# Patient Record
Sex: FEMALE | Race: WHITE | Employment: FULL TIME | URBAN - METROPOLITAN AREA
[De-identification: names, ages, dates, MRNs, and addresses within clinical notes are randomized per-mention and may not be internally consistent; named-entity substitution may affect disease eponyms.]

---

## 2017-03-07 NOTE — TELEPHONE ENCOUNTER
From: Iona Cordero  To:  Zac Adams DO  Sent: 3/7/2017 4:13 PM CST  Subject: Medication Renewal Request    Original authorizing provider: DO Iona Sparks would like a refill of the following medications:  SUMAtriptan Succinate

## 2017-03-09 RX ORDER — SUMATRIPTAN 50 MG/1
TABLET, FILM COATED ORAL
Qty: 30 TABLET | Refills: 1 | OUTPATIENT
Start: 2017-03-09

## 2017-03-15 NOTE — TELEPHONE ENCOUNTER
From: Roney Nichole  To: Yamil Vásquez MD  Sent: 3/15/2017 2:38 PM CDT  Subject: Medication Renewal Request    Original authorizing provider: MD Roney Mejia would like a refill of the following medications:  Sertraline HCl

## 2017-03-16 RX ORDER — SERTRALINE HYDROCHLORIDE 100 MG/1
100 TABLET, FILM COATED ORAL DAILY
Qty: 90 TABLET | Refills: 0 | Status: SHIPPED
Start: 2017-03-16 | End: 2017-06-07

## 2017-06-07 ENCOUNTER — OFFICE VISIT (OUTPATIENT)
Dept: OBGYN CLINIC | Facility: CLINIC | Age: 47
End: 2017-06-07

## 2017-06-07 VITALS
SYSTOLIC BLOOD PRESSURE: 114 MMHG | HEIGHT: 62.5 IN | DIASTOLIC BLOOD PRESSURE: 62 MMHG | BODY MASS INDEX: 24.22 KG/M2 | WEIGHT: 135 LBS

## 2017-06-07 DIAGNOSIS — Z01.419 WELL WOMAN EXAM WITH ROUTINE GYNECOLOGICAL EXAM: Primary | ICD-10-CM

## 2017-06-07 DIAGNOSIS — F41.9 ANXIETY: ICD-10-CM

## 2017-06-07 DIAGNOSIS — Z12.31 SCREENING MAMMOGRAM, ENCOUNTER FOR: ICD-10-CM

## 2017-06-07 DIAGNOSIS — G43.709 CHRONIC MIGRAINE WITHOUT AURA WITHOUT STATUS MIGRAINOSUS, NOT INTRACTABLE: ICD-10-CM

## 2017-06-07 PROCEDURE — 99396 PREV VISIT EST AGE 40-64: CPT | Performed by: OBSTETRICS & GYNECOLOGY

## 2017-06-07 RX ORDER — SERTRALINE HYDROCHLORIDE 100 MG/1
100 TABLET, FILM COATED ORAL DAILY
Qty: 90 TABLET | Refills: 3 | Status: SHIPPED | OUTPATIENT
Start: 2017-06-07 | End: 2017-06-07

## 2017-06-07 RX ORDER — SUMATRIPTAN 50 MG/1
TABLET, FILM COATED ORAL
Qty: 9 TABLET | Refills: 5 | Status: SHIPPED | OUTPATIENT
Start: 2017-06-07 | End: 2018-08-07

## 2017-06-07 RX ORDER — SUMATRIPTAN 50 MG/1
TABLET, FILM COATED ORAL
Qty: 9 TABLET | Refills: 5 | Status: SHIPPED | OUTPATIENT
Start: 2017-06-07 | End: 2017-06-07

## 2017-06-07 RX ORDER — SERTRALINE HYDROCHLORIDE 100 MG/1
100 TABLET, FILM COATED ORAL DAILY
Qty: 90 TABLET | Refills: 3 | Status: SHIPPED | OUTPATIENT
Start: 2017-06-07 | End: 2018-08-07

## 2017-06-07 NOTE — PROGRESS NOTES
Patient ID:   Renita Figueroa  is a 55year old female [id number]        HPI:     Here for general gyn exam. Last seen February 2016. New medical/surgical hx:  None. Patient's last menstrual period was 05/20/2017. Menses:   Hx Prior Abnormal Pap: No rhythm, normal heart sounds. Pulmonary/Chest: Clear to auscultation. Breath sounds normal.  Breasts:   Symmetrical.  Firm tissue with homogeneous texture. No palpable abnormalities, tenderness, secretions, or adenopathy. Abdominal: Soft.  No distension

## 2017-06-07 NOTE — TELEPHONE ENCOUNTER
From: Perez Schroeder  To: Margarita Muir MD  Sent: 6/7/2017 10:25 AM CDT  Subject: Medication Renewal Request    Original authorizing provider: MD Perez Du would like a refill of the following medications:  Sertraline HCl

## 2018-07-17 ENCOUNTER — TELEPHONE (OUTPATIENT)
Dept: OBGYN CLINIC | Facility: CLINIC | Age: 48
End: 2018-07-17

## 2018-07-17 DIAGNOSIS — Z12.31 ENCOUNTER FOR SCREENING MAMMOGRAM FOR BREAST CANCER: Primary | ICD-10-CM

## 2018-07-17 NOTE — TELEPHONE ENCOUNTER
3D Screening mammogram order entered. Pt had previous order for 3D mammogram by Dr. Ed Millan but order .

## 2018-08-07 ENCOUNTER — OFFICE VISIT (OUTPATIENT)
Dept: OBGYN CLINIC | Facility: CLINIC | Age: 48
End: 2018-08-07
Payer: COMMERCIAL

## 2018-08-07 VITALS
DIASTOLIC BLOOD PRESSURE: 68 MMHG | WEIGHT: 132 LBS | BODY MASS INDEX: 24.6 KG/M2 | HEIGHT: 61.5 IN | SYSTOLIC BLOOD PRESSURE: 112 MMHG

## 2018-08-07 DIAGNOSIS — Z01.419 ENCOUNTER FOR GYNECOLOGICAL EXAMINATION WITHOUT ABNORMAL FINDING: Primary | ICD-10-CM

## 2018-08-07 DIAGNOSIS — N92.0 MENORRHAGIA WITH REGULAR CYCLE: ICD-10-CM

## 2018-08-07 DIAGNOSIS — Z00.00 ROUTINE HEALTH MAINTENANCE: ICD-10-CM

## 2018-08-07 PROCEDURE — 99396 PREV VISIT EST AGE 40-64: CPT | Performed by: OBSTETRICS & GYNECOLOGY

## 2018-08-07 RX ORDER — SERTRALINE HYDROCHLORIDE 100 MG/1
100 TABLET, FILM COATED ORAL DAILY
Qty: 90 TABLET | Refills: 3 | Status: SHIPPED | OUTPATIENT
Start: 2018-08-07 | End: 2018-10-29

## 2018-08-07 NOTE — PROGRESS NOTES
Patient ID:   Viry Jacinto  is a 50year old female [id number]        HPI:     Here for gyn exam. Last seen February 2016. New medical/surgical hx:  None. Doing well on generic Zoloft. Patient's last menstrual period was 07/15/2018 (approximate). No cervical adenopathy. Cardiovascular: Normal rate, rhythm, heart sounds. Pulmonary/Chest: Clear to auscultation. Breasts:   Symmetrical.  Soft tissue with homogeneous texture. No palpable abnormalities, secretions, or adenopathy. Abdominal: Soft.

## 2018-09-26 ENCOUNTER — HOSPITAL ENCOUNTER (OUTPATIENT)
Dept: CT IMAGING | Age: 48
Discharge: HOME | End: 2018-09-26
Attending: FAMILY MEDICINE

## 2018-09-26 DIAGNOSIS — Z13.9 ENCOUNTER FOR SCREENING: ICD-10-CM

## 2018-10-29 ENCOUNTER — OFFICE VISIT (OUTPATIENT)
Dept: FAMILY MEDICINE CLINIC | Facility: CLINIC | Age: 48
End: 2018-10-29
Payer: COMMERCIAL

## 2018-10-29 VITALS
BODY MASS INDEX: 23.92 KG/M2 | RESPIRATION RATE: 18 BRPM | HEIGHT: 61.7 IN | SYSTOLIC BLOOD PRESSURE: 110 MMHG | HEART RATE: 68 BPM | DIASTOLIC BLOOD PRESSURE: 70 MMHG | WEIGHT: 130 LBS | TEMPERATURE: 99 F | OXYGEN SATURATION: 97 %

## 2018-10-29 DIAGNOSIS — J02.9 SORE THROAT: Primary | ICD-10-CM

## 2018-10-29 PROCEDURE — 87147 CULTURE TYPE IMMUNOLOGIC: CPT | Performed by: FAMILY MEDICINE

## 2018-10-29 PROCEDURE — 87081 CULTURE SCREEN ONLY: CPT | Performed by: FAMILY MEDICINE

## 2018-10-29 PROCEDURE — 87880 STREP A ASSAY W/OPTIC: CPT | Performed by: FAMILY MEDICINE

## 2018-10-29 PROCEDURE — 99213 OFFICE O/P EST LOW 20 MIN: CPT | Performed by: FAMILY MEDICINE

## 2018-10-29 NOTE — PROGRESS NOTES
CC: sore throat    HPI:     Sore throat several days.  and daughter had similar. Some cough and body aches.      ROS: no fever, no vomiting or diarrhea    EXAM:   /70   Pulse 68   Temp 98.5 °F (36.9 °C) (Temporal)   Resp 18   Ht 61.7\"   Wt 130

## 2018-10-31 ENCOUNTER — HOSPITAL ENCOUNTER (OUTPATIENT)
Age: 48
Discharge: HOME | End: 2018-10-31
Attending: FAMILY MEDICINE
Payer: COMMERCIAL

## 2018-10-31 VITALS
HEART RATE: 113 BPM | BODY MASS INDEX: 24 KG/M2 | SYSTOLIC BLOOD PRESSURE: 120 MMHG | DIASTOLIC BLOOD PRESSURE: 83 MMHG | RESPIRATION RATE: 16 BRPM | TEMPERATURE: 99 F | WEIGHT: 130 LBS | OXYGEN SATURATION: 98 %

## 2018-10-31 DIAGNOSIS — J02.9 ACUTE VIRAL PHARYNGITIS: Primary | ICD-10-CM

## 2018-10-31 PROCEDURE — 87081 CULTURE SCREEN ONLY: CPT | Performed by: FAMILY MEDICINE

## 2018-10-31 PROCEDURE — 87430 STREP A AG IA: CPT | Performed by: FAMILY MEDICINE

## 2018-10-31 PROCEDURE — 99214 OFFICE O/P EST MOD 30 MIN: CPT

## 2018-10-31 PROCEDURE — 99204 OFFICE O/P NEW MOD 45 MIN: CPT

## 2018-10-31 RX ORDER — PREDNISONE 20 MG/1
20 TABLET ORAL DAILY
Qty: 3 TABLET | Refills: 0 | Status: SHIPPED | OUTPATIENT
Start: 2018-10-31 | End: 2018-11-03

## 2018-10-31 RX ORDER — BENZONATATE 100 MG/1
100 CAPSULE ORAL 3 TIMES DAILY PRN
Qty: 30 CAPSULE | Refills: 0 | Status: SHIPPED | OUTPATIENT
Start: 2018-10-31 | End: 2018-11-30

## 2018-10-31 RX ORDER — IBUPROFEN 600 MG/1
600 TABLET ORAL ONCE
Status: COMPLETED | OUTPATIENT
Start: 2018-10-31 | End: 2018-10-31

## 2018-10-31 NOTE — ED PROVIDER NOTES
Patient Seen in: [facility] In [place]    History   Patient presents with:  Sore Throat  Cough/URI    Stated Complaint: SORE THROAT    HPI    66-year-old female with a history of anxiety, chronic rhinitis, and migraines presents the IC secondar 98%   BMI 24.01 kg/m²         Physical Exam    Gen: Patient in no acute distress. HEENT: No sinus tenderness to palpation. Bilateral TMs are clear. Bilateral nares are boggy. MMM. Posterior pharynx is with erythema. No exudate.   Mildly enlarged uvula and

## 2018-10-31 NOTE — ED INITIAL ASSESSMENT (HPI)
Pt sts sore throat, cough/cold symptoms, body aches/chills/sweats began 3 days ago. Yesterday began with pain to chest when taking deep breaths. Mild SOB with activity. Cough is productive.

## 2018-11-01 RX ORDER — AMOXICILLIN 500 MG/1
500 CAPSULE ORAL 3 TIMES DAILY
Qty: 30 CAPSULE | Refills: 0 | Status: SHIPPED | OUTPATIENT
Start: 2018-11-01 | End: 2018-11-11

## 2018-11-08 ENCOUNTER — HOSPITAL ENCOUNTER (EMERGENCY)
Age: 48
Discharge: HOME | End: 2018-11-08
Attending: EMERGENCY MEDICINE
Payer: COMMERCIAL

## 2018-11-08 VITALS
HEIGHT: 62 IN | SYSTOLIC BLOOD PRESSURE: 114 MMHG | TEMPERATURE: 99 F | BODY MASS INDEX: 24.84 KG/M2 | RESPIRATION RATE: 14 BRPM | HEART RATE: 76 BPM | DIASTOLIC BLOOD PRESSURE: 73 MMHG | OXYGEN SATURATION: 100 % | WEIGHT: 135 LBS

## 2018-11-08 DIAGNOSIS — R53.83 FATIGUE, UNSPECIFIED TYPE: Primary | ICD-10-CM

## 2018-11-08 PROCEDURE — 99284 EMERGENCY DEPT VISIT MOD MDM: CPT | Performed by: EMERGENCY MEDICINE

## 2018-11-08 PROCEDURE — 96360 HYDRATION IV INFUSION INIT: CPT | Performed by: EMERGENCY MEDICINE

## 2018-11-08 PROCEDURE — 99283 EMERGENCY DEPT VISIT LOW MDM: CPT | Performed by: EMERGENCY MEDICINE

## 2018-11-08 PROCEDURE — 85025 COMPLETE CBC W/AUTO DIFF WBC: CPT | Performed by: EMERGENCY MEDICINE

## 2018-11-08 PROCEDURE — 80053 COMPREHEN METABOLIC PANEL: CPT | Performed by: EMERGENCY MEDICINE

## 2018-11-08 NOTE — ED INITIAL ASSESSMENT (HPI)
Sore throat 2 weeks,  Strep positive started on abx, was vomiting, throat better, able to swallow, has ringing in ears, tired, no energy, no fever

## 2018-11-08 NOTE — ED PROVIDER NOTES
Patient Seen in: [facility] [place]    History   Patient presents with:  Sore Throat  Fatigue (constitutional, neurologic)    Stated Complaint: sore throat, fatigue, ringing in ears    HPI    Carin Soares is a pleasant 27-year-old female p exam shows no uvula edema or shift. There is no tongue elevation and palatine tonsils show no purulent material or erythema.   No submandibular erythema and no tenderness along the sternocleidomastoid and no nuchal rigidity  Lungs are clear to auscultation

## 2019-04-30 NOTE — PROGRESS NOTES
CC: anxiety    HPI:     Location [facility] settings  Quality worried, up tight, hard to make decisions  Severity moderate  Duration chronic  Timing getting more frequent      ROS: some sleep disturbance, no si or hi, no palpitations    Past Medical History:

## 2019-07-13 ENCOUNTER — HOSPITAL ENCOUNTER (OUTPATIENT)
Dept: MAMMOGRAPHY | Age: 49
Discharge: HOME | End: 2019-07-13
Attending: OBSTETRICS & GYNECOLOGY
Payer: COMMERCIAL

## 2019-07-13 DIAGNOSIS — Z12.31 ENCOUNTER FOR SCREENING MAMMOGRAM FOR BREAST CANCER: ICD-10-CM

## 2019-07-13 PROCEDURE — 77063 BREAST TOMOSYNTHESIS BI: CPT | Performed by: OBSTETRICS & GYNECOLOGY

## 2019-07-13 PROCEDURE — 77067 SCR MAMMO BI INCL CAD: CPT | Performed by: OBSTETRICS & GYNECOLOGY

## 2019-07-23 ENCOUNTER — OFFICE VISIT (OUTPATIENT)
Dept: FAMILY MEDICINE CLINIC | Facility: CLINIC | Age: 49
End: 2019-07-23
Payer: COMMERCIAL

## 2019-07-23 VITALS
HEIGHT: 62 IN | HEART RATE: 72 BPM | BODY MASS INDEX: 25.03 KG/M2 | RESPIRATION RATE: 16 BRPM | DIASTOLIC BLOOD PRESSURE: 80 MMHG | TEMPERATURE: 98 F | WEIGHT: 136 LBS | SYSTOLIC BLOOD PRESSURE: 122 MMHG

## 2019-07-23 DIAGNOSIS — Z12.39 BREAST CANCER SCREENING: ICD-10-CM

## 2019-07-23 DIAGNOSIS — Z00.00 GENERAL MEDICAL EXAM: Primary | ICD-10-CM

## 2019-07-23 LAB
ALBUMIN SERPL-MCNC: 3.9 G/DL (ref 3.4–5)
ALBUMIN/GLOB SERPL: 1 {RATIO} (ref 1–2)
ALP LIVER SERPL-CCNC: 67 U/L (ref 39–100)
ALT SERPL-CCNC: 20 U/L (ref 13–56)
ANION GAP SERPL CALC-SCNC: 7 MMOL/L (ref 0–18)
AST SERPL-CCNC: 20 U/L (ref 15–37)
BILIRUB SERPL-MCNC: 0.6 MG/DL (ref 0.1–2)
BUN BLD-MCNC: 11 MG/DL (ref 7–18)
BUN/CREAT SERPL: 16.9 (ref 10–20)
CALCIUM BLD-MCNC: 9 MG/DL (ref 8.5–10.1)
CHLORIDE SERPL-SCNC: 108 MMOL/L (ref 98–112)
CHOLEST SMN-MCNC: 177 MG/DL (ref ?–200)
CO2 SERPL-SCNC: 22 MMOL/L (ref 21–32)
CREAT BLD-MCNC: 0.65 MG/DL (ref 0.55–1.02)
DEPRECATED RDW RBC AUTO: 48.8 FL (ref 35.1–46.3)
ERYTHROCYTE [DISTWIDTH] IN BLOOD BY AUTOMATED COUNT: 17.2 % (ref 11–15)
EST. AVERAGE GLUCOSE BLD GHB EST-MCNC: 128 MG/DL (ref 68–126)
GLOBULIN PLAS-MCNC: 3.9 G/DL (ref 2.8–4.4)
GLUCOSE BLD-MCNC: 87 MG/DL (ref 70–99)
HBA1C MFR BLD HPLC: 6.1 % (ref ?–5.7)
HCT VFR BLD AUTO: 34.9 % (ref 35–48)
HDLC SERPL-MCNC: 61 MG/DL (ref 40–59)
HGB BLD-MCNC: 10.7 G/DL (ref 12–16)
LDLC SERPL CALC-MCNC: 106 MG/DL (ref ?–100)
M PROTEIN MFR SERPL ELPH: 7.8 G/DL (ref 6.4–8.2)
MCH RBC QN AUTO: 23.8 PG (ref 26–34)
MCHC RBC AUTO-ENTMCNC: 30.7 G/DL (ref 31–37)
MCV RBC AUTO: 77.7 FL (ref 80–100)
NONHDLC SERPL-MCNC: 116 MG/DL (ref ?–130)
OSMOLALITY SERPL CALC.SUM OF ELEC: 283 MOSM/KG (ref 275–295)
PLATELET # BLD AUTO: 280 10(3)UL (ref 150–450)
POTASSIUM SERPL-SCNC: 4.1 MMOL/L (ref 3.5–5.1)
RBC # BLD AUTO: 4.49 X10(6)UL (ref 3.8–5.3)
SODIUM SERPL-SCNC: 137 MMOL/L (ref 136–145)
TRIGL SERPL-MCNC: 50 MG/DL (ref 30–149)
TSI SER-ACNC: 2.32 MIU/ML (ref 0.36–3.74)
VIT D+METAB SERPL-MCNC: 23.7 NG/ML (ref 30–100)
VLDLC SERPL CALC-MCNC: 10 MG/DL (ref 0–30)
WBC # BLD AUTO: 5.3 X10(3) UL (ref 4–11)

## 2019-07-23 PROCEDURE — 83036 HEMOGLOBIN GLYCOSYLATED A1C: CPT | Performed by: FAMILY MEDICINE

## 2019-07-23 PROCEDURE — 80050 GENERAL HEALTH PANEL: CPT | Performed by: FAMILY MEDICINE

## 2019-07-23 PROCEDURE — 82306 VITAMIN D 25 HYDROXY: CPT | Performed by: FAMILY MEDICINE

## 2019-07-23 PROCEDURE — 80061 LIPID PANEL: CPT | Performed by: FAMILY MEDICINE

## 2019-07-23 PROCEDURE — 99396 PREV VISIT EST AGE 40-64: CPT | Performed by: FAMILY MEDICINE

## 2019-07-23 NOTE — PROGRESS NOTES
Harmony Cheng is a 52year old female who presents for a complete physical exam.   HPI:   Pt generally doing well. Patient sees gynecologist for all breast and  care.           Immunization History  Administered            Date(s) Administered    Infl Past Medical History:   Diagnosis Date   • Anxiety    • Anxiety state, unspecified    • Chronic rhinitis    • Migraine    • Migraines       Past Surgical History:   Procedure Laterality Date   • D & C  2003    Missed AB      Family History   Problem Rela cervix pink and without lesion  RECTAL: deferred   MUSCULOSKELETAL: back is not tender,FROM of the back  EXTREMITIES: no cyanosis, clubbing or edema  NEURO: Oriented times three,cranial nerves are intact,motor and sensory are grossly intact    ASSESSMENT A

## 2019-08-06 ENCOUNTER — OFFICE VISIT (OUTPATIENT)
Dept: FAMILY MEDICINE CLINIC | Facility: CLINIC | Age: 49
End: 2019-08-06
Payer: COMMERCIAL

## 2019-08-06 VITALS
SYSTOLIC BLOOD PRESSURE: 108 MMHG | RESPIRATION RATE: 16 BRPM | OXYGEN SATURATION: 98 % | DIASTOLIC BLOOD PRESSURE: 68 MMHG | HEIGHT: 62 IN | HEART RATE: 94 BPM | BODY MASS INDEX: 24.84 KG/M2 | TEMPERATURE: 98 F | WEIGHT: 135 LBS

## 2019-08-06 DIAGNOSIS — D50.0 IRON DEFICIENCY ANEMIA DUE TO CHRONIC BLOOD LOSS: Primary | ICD-10-CM

## 2019-08-06 DIAGNOSIS — E55.9 HYPOVITAMINOSIS D: ICD-10-CM

## 2019-08-06 DIAGNOSIS — R73.09 ABNORMAL GLUCOSE: ICD-10-CM

## 2019-08-06 PROCEDURE — 99213 OFFICE O/P EST LOW 20 MIN: CPT | Performed by: FAMILY MEDICINE

## 2019-08-06 NOTE — PROGRESS NOTES
CC: Follow-up abnormal labs    HPI: Patient has anemia noted. Most likely iron deficiency discussed over-the-counter supplementation to improve this. Also has abnormal glucose.   Her fasting blood sugar was actually normal but her A1c is at 6.1 indicating supplement at night. Vitamin D supplement as well. Follow-up labs in 6 months. No orders of the defined types were placed in this encounter.       Meds & Refills for this Visit:  Requested Prescriptions      No prescriptions requested or ordered in this

## 2019-08-06 NOTE — PATIENT INSTRUCTIONS
1 \"Slow Fe\" tablet nightly     Vitamin D 2000 IU gelcap daily    Dietary recommendations: restrict intake of carbohydrates from sources like bread, pasta, rice, cereal, potatoes or other starchy vegetables.  Increase dramatically your intake of leafy gree

## 2019-10-28 ENCOUNTER — TELEPHONE (OUTPATIENT)
Dept: FAMILY MEDICINE CLINIC | Facility: CLINIC | Age: 49
End: 2019-10-28

## 2019-12-03 ENCOUNTER — TELEPHONE (OUTPATIENT)
Dept: FAMILY MEDICINE CLINIC | Facility: CLINIC | Age: 49
End: 2019-12-03

## 2019-12-03 ENCOUNTER — PATIENT MESSAGE (OUTPATIENT)
Dept: FAMILY MEDICINE CLINIC | Facility: CLINIC | Age: 49
End: 2019-12-03

## 2019-12-03 NOTE — TELEPHONE ENCOUNTER
From: Perez Schroeder  To: Chrissy Proctor DO  Sent: 12/3/2019 1:37 PM CST  Subject: Other    I had a mammogram done on 7-13-19. Please update my files. Thank you.   Abebe Palacios

## 2019-12-03 NOTE — TELEPHONE ENCOUNTER
[facility] sent back to patient asking where mammogram was done so we can obtain records.  There is an old order in [facility] from July 2019 placed by Dr Contreras Doshi

## 2020-01-30 ENCOUNTER — OFFICE VISIT (OUTPATIENT)
Dept: FAMILY MEDICINE CLINIC | Facility: CLINIC | Age: 50
End: 2020-01-30
Payer: COMMERCIAL

## 2020-01-30 VITALS
OXYGEN SATURATION: 98 % | BODY MASS INDEX: 27.23 KG/M2 | HEIGHT: 62 IN | DIASTOLIC BLOOD PRESSURE: 74 MMHG | TEMPERATURE: 97 F | SYSTOLIC BLOOD PRESSURE: 116 MMHG | HEART RATE: 112 BPM | RESPIRATION RATE: 16 BRPM | WEIGHT: 148 LBS

## 2020-01-30 DIAGNOSIS — J11.1 INFLUENZA: Primary | ICD-10-CM

## 2020-01-30 PROCEDURE — 99214 OFFICE O/P EST MOD 30 MIN: CPT | Performed by: FAMILY MEDICINE

## 2020-01-30 RX ORDER — OSELTAMIVIR PHOSPHATE 75 MG/1
75 CAPSULE ORAL 2 TIMES DAILY
Qty: 10 CAPSULE | Refills: 0 | Status: SHIPPED | OUTPATIENT
Start: 2020-01-30 | End: 2020-02-04

## 2020-01-30 NOTE — PROGRESS NOTES
CC: cough    HPI:     Location chest  Quality deep, dry  Severity moderate  Duration 1-2 days  Timing frequent  Context ill contact with flu positive coworker      ROS:   GENERAL HEALTH: feels generalized body aches  SKIN: denies any unusual skin lesions o 2 (two) times daily for 5 days.        Imaging & Consults:  None

## 2020-03-14 NOTE — TELEPHONE ENCOUNTER
LOV 1/30/20 for influenza. SERTRALINE HCL 50 MG Oral Tab 270 tablet 0 12/26/2019    Sig:   TAKE 3 TABLETS (=150MG     TOTAL) DAILY     Route:   (none)       No future appointments.

## 2020-06-02 NOTE — TELEPHONE ENCOUNTER
Last refilled on 3/15/20 for # 270 with 0 refills  Last OV 1/30/20 for flu  Future Appointments   Date Time Provider Walt [place]   6/18/2020  8:00 AM DO MIKY AlasOSW [facility] [place]        Thank you.

## 2020-06-18 ENCOUNTER — OFFICE VISIT (OUTPATIENT)
Dept: FAMILY MEDICINE CLINIC | Facility: CLINIC | Age: 50
End: 2020-06-18
Payer: COMMERCIAL

## 2020-06-18 VITALS
HEIGHT: 61.8 IN | SYSTOLIC BLOOD PRESSURE: 110 MMHG | WEIGHT: 150.19 LBS | OXYGEN SATURATION: 99 % | HEART RATE: 96 BPM | BODY MASS INDEX: 27.64 KG/M2 | DIASTOLIC BLOOD PRESSURE: 78 MMHG | RESPIRATION RATE: 16 BRPM | TEMPERATURE: 99 F

## 2020-06-18 DIAGNOSIS — Z00.00 GENERAL MEDICAL EXAM: Primary | ICD-10-CM

## 2020-06-18 DIAGNOSIS — Z12.11 COLON CANCER SCREENING: ICD-10-CM

## 2020-06-18 DIAGNOSIS — Z12.39 BREAST CANCER SCREENING: ICD-10-CM

## 2020-06-18 PROCEDURE — 99396 PREV VISIT EST AGE 40-64: CPT | Performed by: FAMILY MEDICINE

## 2020-06-18 PROCEDURE — 82306 VITAMIN D 25 HYDROXY: CPT | Performed by: FAMILY MEDICINE

## 2020-06-18 PROCEDURE — 80050 GENERAL HEALTH PANEL: CPT | Performed by: FAMILY MEDICINE

## 2020-06-18 PROCEDURE — 80061 LIPID PANEL: CPT | Performed by: FAMILY MEDICINE

## 2020-06-18 NOTE — PROGRESS NOTES
Erick Maddox is a 52year old female who presents for a complete physical exam.   HPI:   Pt generally doing well. Patient sees gynecologist for all breast and  care. NO complaints.        Immunization History  Administered            Date(s) [employer] tablet 0   • VALACYCLOVIR HCL OR Take 1 tablet by mouth as needed.      • SUMAtriptan Succinate (IMITREX) 50 MG Oral Tab 1 tab po prn migraine, may repeat q 2 hours with max dose of 200mg in 24 hours 30 tablet 1      Past Medical History:   Diagnosis Date lesions on exposed areas  HEENT: atraumatic, normocephalic,ears and throat are clear  EYES:PERRLA, EOMI, conjunctiva are clear  NECK: supple,no adenopathy,no bruits  BREAST: Normal contours bilaterally without palpable mass  LUNGS: clear to auscultation  C

## 2020-06-19 ENCOUNTER — TELEPHONE (OUTPATIENT)
Dept: FAMILY MEDICINE CLINIC | Facility: CLINIC | Age: 50
End: 2020-06-19

## 2020-06-19 NOTE — TELEPHONE ENCOUNTER
----- Message from Mary Ann Menezes DO sent at 6/19/2020 12:01 PM CDT -----  Schedule telephone visit for next week to go over labs - main concern is the anemia, no emergencies.

## 2020-06-19 NOTE — TELEPHONE ENCOUNTER
Spoke with pt  Pt notify  She verbalized understanding. Apt scheduled.   Future Appointments   Date Time Provider Walt Valdez   6/23/2020  4:15 PM DO AYDE Nguyen [facility] [place]

## 2020-06-23 ENCOUNTER — OFFICE VISIT (OUTPATIENT)
Dept: FAMILY MEDICINE CLINIC | Facility: CLINIC | Age: 50
End: 2020-06-23
Payer: COMMERCIAL

## 2020-06-23 VITALS
RESPIRATION RATE: 18 BRPM | HEIGHT: 61.8 IN | HEART RATE: 90 BPM | OXYGEN SATURATION: 98 % | SYSTOLIC BLOOD PRESSURE: 130 MMHG | DIASTOLIC BLOOD PRESSURE: 84 MMHG | TEMPERATURE: 98 F | BODY MASS INDEX: 27.6 KG/M2 | WEIGHT: 150 LBS

## 2020-06-23 DIAGNOSIS — D64.9 ANEMIA, UNSPECIFIED TYPE: Primary | ICD-10-CM

## 2020-06-23 PROCEDURE — 99213 OFFICE O/P EST LOW 20 MIN: CPT | Performed by: FAMILY MEDICINE

## 2020-06-30 NOTE — PROGRESS NOTES
CC: Follow-up labs    HPI:     Patient continues to be significantly anemic. She does have heavy menstrual cycles. Discussed consultation with gynecologist for possible ablation procedure.   Also vitamin D is low and she was advised to take a supplement

## 2020-07-20 ENCOUNTER — TELEPHONE (OUTPATIENT)
Dept: FAMILY MEDICINE CLINIC | Facility: CLINIC | Age: 50
End: 2020-07-20

## 2020-08-15 ENCOUNTER — HOSPITAL ENCOUNTER (OUTPATIENT)
Dept: MAMMOGRAPHY | Age: 50
Discharge: HOME | End: 2020-08-15
Attending: FAMILY MEDICINE
Payer: COMMERCIAL

## 2020-08-15 DIAGNOSIS — Z12.39 BREAST CANCER SCREENING: ICD-10-CM

## 2020-08-15 PROCEDURE — 77067 SCR MAMMO BI INCL CAD: CPT | Performed by: FAMILY MEDICINE

## 2020-08-15 PROCEDURE — 77063 BREAST TOMOSYNTHESIS BI: CPT | Performed by: FAMILY MEDICINE

## 2020-08-24 ENCOUNTER — HOSPITAL ENCOUNTER (OUTPATIENT)
Dept: MAMMOGRAPHY | Age: 50
Discharge: HOME | End: 2020-08-24
Attending: FAMILY MEDICINE
Payer: COMMERCIAL

## 2020-08-24 ENCOUNTER — HOSPITAL ENCOUNTER (OUTPATIENT)
Dept: ULTRASOUND IMAGING | Age: 50
Discharge: HOME | End: 2020-08-24
Attending: FAMILY MEDICINE
Payer: COMMERCIAL

## 2020-08-24 DIAGNOSIS — R92.2 INCONCLUSIVE MAMMOGRAM: ICD-10-CM

## 2020-08-24 PROCEDURE — 76642 ULTRASOUND BREAST LIMITED: CPT | Performed by: FAMILY MEDICINE

## 2020-08-24 PROCEDURE — 77065 DX MAMMO INCL CAD UNI: CPT | Performed by: FAMILY MEDICINE

## 2020-08-24 PROCEDURE — 77061 BREAST TOMOSYNTHESIS UNI: CPT | Performed by: FAMILY MEDICINE

## 2020-09-08 ENCOUNTER — OFFICE VISIT (OUTPATIENT)
Dept: OBGYN CLINIC | Facility: CLINIC | Age: 50
End: 2020-09-08
Payer: COMMERCIAL

## 2020-09-08 VITALS
TEMPERATURE: 98 F | DIASTOLIC BLOOD PRESSURE: 78 MMHG | HEIGHT: 62.5 IN | SYSTOLIC BLOOD PRESSURE: 122 MMHG | BODY MASS INDEX: 27.27 KG/M2 | WEIGHT: 152 LBS

## 2020-09-08 DIAGNOSIS — Z01.419 WELL WOMAN EXAM WITH ROUTINE GYNECOLOGICAL EXAM: Primary | ICD-10-CM

## 2020-09-08 DIAGNOSIS — Z12.4 CERVICAL CANCER SCREENING: ICD-10-CM

## 2020-09-08 DIAGNOSIS — N93.9 ABNORMAL UTERINE BLEEDING (AUB): ICD-10-CM

## 2020-09-08 PROCEDURE — 87624 HPV HI-RISK TYP POOLED RSLT: CPT | Performed by: OBSTETRICS & GYNECOLOGY

## 2020-09-08 PROCEDURE — 58100 BIOPSY OF UTERUS LINING: CPT | Performed by: OBSTETRICS & GYNECOLOGY

## 2020-09-08 PROCEDURE — 3008F BODY MASS INDEX DOCD: CPT | Performed by: OBSTETRICS & GYNECOLOGY

## 2020-09-08 PROCEDURE — 3074F SYST BP LT 130 MM HG: CPT | Performed by: OBSTETRICS & GYNECOLOGY

## 2020-09-08 PROCEDURE — 88305 TISSUE EXAM BY PATHOLOGIST: CPT | Performed by: OBSTETRICS & GYNECOLOGY

## 2020-09-08 PROCEDURE — 3078F DIAST BP <80 MM HG: CPT | Performed by: OBSTETRICS & GYNECOLOGY

## 2020-09-08 PROCEDURE — 99396 PREV VISIT EST AGE 40-64: CPT | Performed by: OBSTETRICS & GYNECOLOGY

## 2020-09-08 NOTE — PROCEDURES
Procedure: Endometrial biopsy     Date of Procedure: 09/08/20    Pre-procedure diagnosis:  AUB    Post-procedure diagnosis:   AUB    Indications:   48year old female [id number] who presents for endometrial biopsy 2/2 AUB    Procedure details:   The procedure,

## 2020-09-08 NOTE — PROGRESS NOTES
OB/GYN H&P       CC: Patient presents with:  Physical: NP, PT states she would like to discuss low iron level due to heavy cycles  Other: OK with Student       HPI: Sheree Ferreira is a 48year old  here for WWE.   Referred by Dr. Patt Redman and martins Problem Relation Age of Onset   • Diabetes Father    • Heart Disorder Father    • Stroke Neg    • Heart Disease Neg    • Cancer Neg      Social History    Tobacco Use      Smoking status: Never Smoker      Smokeless tobacco: Never Used      Tobacco comme REQUEST B    Abnormal uterine bleeding (AUB)        Relevant Orders    SURGICAL PATHOLOGY TISSUE          Patient counseled on diet/exercise     See procedure note for EMB. Discussed AUB with patient. Possible etiologies reviewed.    Reviewed RF for endo

## 2020-09-14 LAB — HPV I/H RISK 1 DNA SPEC QL NAA+PROBE: NEGATIVE

## 2020-09-14 NOTE — TELEPHONE ENCOUNTER
Medication Quantity Refills Start End   SERTRALINE HCL 50 MG Oral Tab 270 tablet 0 6/2/2020      Last OV 6/23/20 for anemia   No future appointments. Please advise.  Thank you

## 2020-12-18 ENCOUNTER — PATIENT MESSAGE (OUTPATIENT)
Dept: FAMILY MEDICINE CLINIC | Facility: CLINIC | Age: 50
End: 2020-12-18

## 2020-12-18 NOTE — TELEPHONE ENCOUNTER
Patient is due for follow up and repeat blood work. Saw Gyne to discuss ablation for heavy menstrual periods. What was decided?

## 2020-12-18 NOTE — TELEPHONE ENCOUNTER
LOV: 6/23/20 for anemia     SERTRALINE HCL 50 MG Oral Tab 270 tablet 0 9/21/2020    Sig:   TAKE 3 TABLETS (=150MG     TOTAL) DAILY       No future appointments. Please advise.

## 2020-12-18 NOTE — TELEPHONE ENCOUNTER
From: Min Paul  To: Chet Modi MD  Sent: 12/18/2020 8:09 AM CST  Subject: Prescription Question    I would like to renew my prescription for Sertraline. Can you please set that up?

## 2021-01-28 ENCOUNTER — HOSPITAL ENCOUNTER (EMERGENCY)
Facility: HOSPITAL | Age: 51
Discharge: HOME | End: 2021-01-28
Attending: EMERGENCY MEDICINE
Payer: COMMERCIAL

## 2021-01-28 ENCOUNTER — APPOINTMENT (OUTPATIENT)
Dept: CT IMAGING | Facility: HOSPITAL | Age: 51
End: 2021-01-28
Attending: EMERGENCY MEDICINE
Payer: COMMERCIAL

## 2021-01-28 ENCOUNTER — APPOINTMENT (OUTPATIENT)
Dept: GENERAL RADIOLOGY | Facility: HOSPITAL | Age: 51
End: 2021-01-28
Attending: EMERGENCY MEDICINE
Payer: COMMERCIAL

## 2021-01-28 ENCOUNTER — OFFICE VISIT (OUTPATIENT)
Dept: FAMILY MEDICINE CLINIC | Facility: CLINIC | Age: 51
End: 2021-01-28
Payer: COMMERCIAL

## 2021-01-28 VITALS
TEMPERATURE: 98 F | DIASTOLIC BLOOD PRESSURE: 88 MMHG | HEART RATE: 86 BPM | BODY MASS INDEX: 27.97 KG/M2 | HEIGHT: 62 IN | SYSTOLIC BLOOD PRESSURE: 142 MMHG | RESPIRATION RATE: 18 BRPM | OXYGEN SATURATION: 98 % | WEIGHT: 152 LBS

## 2021-01-28 VITALS
HEART RATE: 81 BPM | SYSTOLIC BLOOD PRESSURE: 127 MMHG | OXYGEN SATURATION: 98 % | WEIGHT: 151.88 LBS | RESPIRATION RATE: 16 BRPM | DIASTOLIC BLOOD PRESSURE: 84 MMHG | BODY MASS INDEX: 27.6 KG/M2 | HEIGHT: 62.01 IN | TEMPERATURE: 98 F

## 2021-01-28 DIAGNOSIS — R07.9 CHEST PAIN OF UNCERTAIN ETIOLOGY: Primary | ICD-10-CM

## 2021-01-28 DIAGNOSIS — R07.89 OTHER CHEST PAIN: Primary | ICD-10-CM

## 2021-01-28 LAB
ALBUMIN SERPL-MCNC: 3.8 G/DL (ref 3.4–5)
ALBUMIN/GLOB SERPL: 1.1 {RATIO} (ref 1–2)
ALP LIVER SERPL-CCNC: 78 U/L
ALT SERPL-CCNC: 23 U/L
ANION GAP SERPL CALC-SCNC: 4 MMOL/L (ref 0–18)
APTT PPP: 32 SECONDS (ref 25.4–36.1)
AST SERPL-CCNC: 15 U/L (ref 15–37)
BASOPHILS # BLD AUTO: 0.04 X10(3) UL (ref 0–0.2)
BASOPHILS NFR BLD AUTO: 0.5 %
BILIRUB SERPL-MCNC: 0.5 MG/DL (ref 0.1–2)
BUN BLD-MCNC: 10 MG/DL (ref 7–18)
BUN/CREAT SERPL: 12.5 (ref 10–20)
CALCIUM BLD-MCNC: 8.5 MG/DL (ref 8.5–10.1)
CHLORIDE SERPL-SCNC: 108 MMOL/L (ref 98–112)
CO2 SERPL-SCNC: 25 MMOL/L (ref 21–32)
CREAT BLD-MCNC: 0.8 MG/DL
D-DIMER: 0.63 UG/ML FEU (ref ?–0.5)
DEPRECATED RDW RBC AUTO: 42.6 FL (ref 35.1–46.3)
EOSINOPHIL # BLD AUTO: 0.33 X10(3) UL (ref 0–0.7)
EOSINOPHIL NFR BLD AUTO: 4.2 %
ERYTHROCYTE [DISTWIDTH] IN BLOOD BY AUTOMATED COUNT: 13.1 % (ref 11–15)
GLOBULIN PLAS-MCNC: 3.5 G/DL (ref 2.8–4.4)
GLUCOSE BLD-MCNC: 146 MG/DL (ref 70–99)
HCT VFR BLD AUTO: 37.6 %
HGB BLD-MCNC: 12.6 G/DL
IMM GRANULOCYTES # BLD AUTO: 0.04 X10(3) UL (ref 0–1)
IMM GRANULOCYTES NFR BLD: 0.5 %
INR BLD: 0.99 (ref 0.89–1.11)
LIPASE SERPL-CCNC: 244 U/L (ref 73–393)
LYMPHOCYTES # BLD AUTO: 1.58 X10(3) UL (ref 1–4)
LYMPHOCYTES NFR BLD AUTO: 20 %
M PROTEIN MFR SERPL ELPH: 7.3 G/DL (ref 6.4–8.2)
MCH RBC QN AUTO: 30 PG (ref 26–34)
MCHC RBC AUTO-ENTMCNC: 33.5 G/DL (ref 31–37)
MCV RBC AUTO: 89.5 FL
MONOCYTES # BLD AUTO: 0.62 X10(3) UL (ref 0.1–1)
MONOCYTES NFR BLD AUTO: 7.8 %
NEUTROPHILS # BLD AUTO: 5.3 X10 (3) UL (ref 1.5–7.7)
NEUTROPHILS # BLD AUTO: 5.3 X10(3) UL (ref 1.5–7.7)
NEUTROPHILS NFR BLD AUTO: 67 %
NT-PROBNP SERPL-MCNC: 19 PG/ML (ref ?–125)
OSMOLALITY SERPL CALC.SUM OF ELEC: 286 MOSM/KG (ref 275–295)
PLATELET # BLD AUTO: 224 10(3)UL (ref 150–450)
POTASSIUM SERPL-SCNC: 3.6 MMOL/L (ref 3.5–5.1)
PSA SERPL DL<=0.01 NG/ML-MCNC: 13.4 SECONDS (ref 12.4–14.6)
RBC # BLD AUTO: 4.2 X10(6)UL
SARS-COV-2 RNA RESP QL NAA+PROBE: NOT DETECTED
SODIUM SERPL-SCNC: 137 MMOL/L (ref 136–145)
TROPONIN I SERPL-MCNC: <0.045 NG/ML (ref ?–0.04)
WBC # BLD AUTO: 7.9 X10(3) UL (ref 4–11)

## 2021-01-28 PROCEDURE — 36415 COLL VENOUS BLD VENIPUNCTURE: CPT

## 2021-01-28 PROCEDURE — 85610 PROTHROMBIN TIME: CPT | Performed by: EMERGENCY MEDICINE

## 2021-01-28 PROCEDURE — 80053 COMPREHEN METABOLIC PANEL: CPT

## 2021-01-28 PROCEDURE — 83690 ASSAY OF LIPASE: CPT | Performed by: EMERGENCY MEDICINE

## 2021-01-28 PROCEDURE — 71045 X-RAY EXAM CHEST 1 VIEW: CPT | Performed by: EMERGENCY MEDICINE

## 2021-01-28 PROCEDURE — 85025 COMPLETE CBC W/AUTO DIFF WBC: CPT | Performed by: EMERGENCY MEDICINE

## 2021-01-28 PROCEDURE — 85379 FIBRIN DEGRADATION QUANT: CPT | Performed by: EMERGENCY MEDICINE

## 2021-01-28 PROCEDURE — 99213 OFFICE O/P EST LOW 20 MIN: CPT | Performed by: FAMILY MEDICINE

## 2021-01-28 PROCEDURE — 85025 COMPLETE CBC W/AUTO DIFF WBC: CPT

## 2021-01-28 PROCEDURE — 84484 ASSAY OF TROPONIN QUANT: CPT | Performed by: EMERGENCY MEDICINE

## 2021-01-28 PROCEDURE — 99285 EMERGENCY DEPT VISIT HI MDM: CPT

## 2021-01-28 PROCEDURE — 83880 ASSAY OF NATRIURETIC PEPTIDE: CPT | Performed by: EMERGENCY MEDICINE

## 2021-01-28 PROCEDURE — 3008F BODY MASS INDEX DOCD: CPT | Performed by: FAMILY MEDICINE

## 2021-01-28 PROCEDURE — 85730 THROMBOPLASTIN TIME PARTIAL: CPT | Performed by: EMERGENCY MEDICINE

## 2021-01-28 PROCEDURE — 93005 ELECTROCARDIOGRAM TRACING: CPT

## 2021-01-28 PROCEDURE — 3077F SYST BP >= 140 MM HG: CPT | Performed by: FAMILY MEDICINE

## 2021-01-28 PROCEDURE — 80053 COMPREHEN METABOLIC PANEL: CPT | Performed by: EMERGENCY MEDICINE

## 2021-01-28 PROCEDURE — 3079F DIAST BP 80-89 MM HG: CPT | Performed by: FAMILY MEDICINE

## 2021-01-28 PROCEDURE — 93010 ELECTROCARDIOGRAM REPORT: CPT

## 2021-01-28 PROCEDURE — 71260 CT THORAX DX C+: CPT | Performed by: EMERGENCY MEDICINE

## 2021-01-28 RX ORDER — ASCORBIC ACID 500 MG
TABLET ORAL
COMMUNITY

## 2021-01-28 RX ORDER — ASPIRIN 81 MG/1
324 TABLET, CHEWABLE ORAL ONCE
Status: COMPLETED | OUTPATIENT
Start: 2021-01-28 | End: 2021-01-28

## 2021-01-28 NOTE — PROGRESS NOTES
No chief complaint on file. Chest pain  HPI:    Patient ID: Jourdan Hardin is a 48year old female.     HPI   Patient reports 3 episodes since Monday when she had pressure like chest pain substernal. On Monday episode was at night while she was sleep Sitting, Cuff Size: adult)   Pulse 86   Temp 97.7 °F (36.5 °C) (Temporal)   Resp 18   Ht 5' 2\" (1.575 m)   Wt 152 lb (68.9 kg)   LMP 01/20/2021 (Exact Date)   SpO2 98%   Breastfeeding No   BMI 27.80 kg/m²    Physical Exam    Constitutional: No distress.

## 2021-01-29 ENCOUNTER — HOSPITAL ENCOUNTER (OUTPATIENT)
Dept: CV DIAGNOSTICS | Facility: HOSPITAL | Age: 51
Discharge: HOME | End: 2021-01-29
Attending: EMERGENCY MEDICINE
Payer: COMMERCIAL

## 2021-01-29 DIAGNOSIS — R07.9 CHEST PAIN OF UNCERTAIN ETIOLOGY: ICD-10-CM

## 2021-01-29 LAB
ATRIAL RATE: 83 BPM
P AXIS: 65 DEGREES
P-R INTERVAL: 168 MS
Q-T INTERVAL: 366 MS
QRS DURATION: 88 MS
QTC CALCULATION (BEZET): 430 MS
R AXIS: 43 DEGREES
T AXIS: 74 DEGREES
VENTRICULAR RATE: 83 BPM

## 2021-01-29 PROCEDURE — 93018 CV STRESS TEST I&R ONLY: CPT | Performed by: EMERGENCY MEDICINE

## 2021-01-29 PROCEDURE — 93350 STRESS TTE ONLY: CPT | Performed by: EMERGENCY MEDICINE

## 2021-01-29 PROCEDURE — 93017 CV STRESS TEST TRACING ONLY: CPT | Performed by: EMERGENCY MEDICINE

## 2021-01-29 NOTE — ED PROVIDER NOTES
Patient Seen in: [facility] Emergency Department      History   Patient presents with:  Chest Pain Angina    Stated Complaint: chest pain    HPI/Subjective:   HPI    51-year-old female presents emergency room for evaluation of chest pain.   Patient d Comment: OCC    Drug use: No             Review of Systems    Positive for stated complaint: chest pain  Other systems are as noted in HPI. Constitutional and vital signs reviewed. All other systems reviewed and negative except as noted above.     Ramu ---------                               -----------         ------                     CBC W/ DIFFERENTIAL[[id number]]                              Final result                 Please view results for these tests on the individual orders. reasonable clinical confidence and prior to discharge, that an emergency medical condition was not or was no longer present. There was no indication for further evaluation, treatment, or admission on an emergency basis.   Comprehensive verbal and written d

## 2021-01-29 NOTE — ED INITIAL ASSESSMENT (HPI)
Intermittent Chest pain x5 days. Pt states she woke from her sleep on saturday with chest discomfort. Heaviness in chest that traveled down both arms. Pt felt very tied after incident. Similar episode happened again Sunday night.   Pt feels ok today, saw

## 2021-01-29 NOTE — CM/SW NOTE
Scheduled outpatient stress test at [address] at [phone]. Called patient and she is aware and instructions given for no caffeine, light breakfast, and loose comfortable clothing with gym shoes.

## 2021-02-05 NOTE — TELEPHONE ENCOUNTER
Schedule her for office visit for er follow up and then we can discuss labs and order if needed. Tell her to come fasting. I did sent her meds.

## 2021-02-20 ENCOUNTER — OFFICE VISIT (OUTPATIENT)
Dept: FAMILY MEDICINE CLINIC | Facility: CLINIC | Age: 51
End: 2021-02-20
Payer: COMMERCIAL

## 2021-02-20 VITALS
SYSTOLIC BLOOD PRESSURE: 122 MMHG | HEART RATE: 91 BPM | TEMPERATURE: 97 F | DIASTOLIC BLOOD PRESSURE: 84 MMHG | BODY MASS INDEX: 27.05 KG/M2 | HEIGHT: 62 IN | RESPIRATION RATE: 18 BRPM | WEIGHT: 147 LBS | OXYGEN SATURATION: 98 %

## 2021-02-20 DIAGNOSIS — F41.9 ANXIETY: ICD-10-CM

## 2021-02-20 DIAGNOSIS — R79.89 LOW T4: ICD-10-CM

## 2021-02-20 DIAGNOSIS — R07.89 OTHER CHEST PAIN: Primary | ICD-10-CM

## 2021-02-20 DIAGNOSIS — E78.2 MIXED HYPERLIPIDEMIA: ICD-10-CM

## 2021-02-20 DIAGNOSIS — R73.09 ABNORMAL GLUCOSE: ICD-10-CM

## 2021-02-20 DIAGNOSIS — Z09 FOLLOW-UP EXAM: ICD-10-CM

## 2021-02-20 LAB
CHOLEST SMN-MCNC: 203 MG/DL (ref ?–200)
EST. AVERAGE GLUCOSE BLD GHB EST-MCNC: 111 MG/DL (ref 68–126)
HBA1C MFR BLD HPLC: 5.5 % (ref ?–5.7)
HDLC SERPL-MCNC: 46 MG/DL (ref 40–59)
LDLC SERPL CALC-MCNC: 139 MG/DL (ref ?–100)
NONHDLC SERPL-MCNC: 157 MG/DL (ref ?–130)
PATIENT FASTING Y/N/NP: YES
T4 FREE SERPL-MCNC: 0.7 NG/DL (ref 0.8–1.7)
TRIGL SERPL-MCNC: 92 MG/DL (ref 30–149)
TSI SER-ACNC: 2.02 MIU/ML (ref 0.36–3.74)
VLDLC SERPL CALC-MCNC: 18 MG/DL (ref 0–30)

## 2021-02-20 PROCEDURE — 3008F BODY MASS INDEX DOCD: CPT | Performed by: FAMILY MEDICINE

## 2021-02-20 PROCEDURE — 84481 FREE ASSAY (FT-3): CPT | Performed by: FAMILY MEDICINE

## 2021-02-20 PROCEDURE — 83036 HEMOGLOBIN GLYCOSYLATED A1C: CPT | Performed by: FAMILY MEDICINE

## 2021-02-20 PROCEDURE — 84443 ASSAY THYROID STIM HORMONE: CPT | Performed by: FAMILY MEDICINE

## 2021-02-20 PROCEDURE — 3079F DIAST BP 80-89 MM HG: CPT | Performed by: FAMILY MEDICINE

## 2021-02-20 PROCEDURE — 80061 LIPID PANEL: CPT | Performed by: FAMILY MEDICINE

## 2021-02-20 PROCEDURE — 3074F SYST BP LT 130 MM HG: CPT | Performed by: FAMILY MEDICINE

## 2021-02-20 PROCEDURE — 99214 OFFICE O/P EST MOD 30 MIN: CPT | Performed by: FAMILY MEDICINE

## 2021-02-20 PROCEDURE — 84439 ASSAY OF FREE THYROXINE: CPT | Performed by: FAMILY MEDICINE

## 2021-02-20 NOTE — PROGRESS NOTES
Patient presents with: Follow - Up: ER- chest pain and labs       HPI:    Patient ID: Harmony Cheng is a 48year old female. HPI   ER follow up for chest pain- stress echo done was neg for cardiac disease.  She did have 2 episode of chest pain since Ht 5' 2\" (1.575 m)   Wt 147 lb (66.7 kg)   LMP 01/20/2021 (Exact Date)   SpO2 98%   BMI 26.89 kg/m²    Physical Exam    Constitutional: No distress. Cardiovascular: Normal rate and regular rhythm.     Pulmonary/Chest: Effort normal and breath sounds norm

## 2021-02-21 ENCOUNTER — TELEPHONE (OUTPATIENT)
Dept: FAMILY MEDICINE CLINIC | Facility: CLINIC | Age: 51
End: 2021-02-21

## 2021-02-21 DIAGNOSIS — R79.89 LOW T4: Primary | ICD-10-CM

## 2021-02-21 LAB — T3FREE SERPL-MCNC: 1.97 PG/ML (ref 2.4–4.2)

## 2021-02-22 ENCOUNTER — TELEPHONE (OUTPATIENT)
Dept: FAMILY MEDICINE CLINIC | Facility: CLINIC | Age: 51
End: 2021-02-22

## 2021-02-22 NOTE — TELEPHONE ENCOUNTER
----- Message from Marely Marquez MD sent at 2/21/2021  9:41 PM CST -----  Call patient and schedule video visit to discuss labs.

## 2021-02-22 NOTE — TELEPHONE ENCOUNTER
Delivery Read From Message Type Attachments Subject   2/22/2021  8:43 AM Vasquez Quintana RN Patient Medical Advice Request  test results

## 2021-03-08 NOTE — TELEPHONE ENCOUNTER
LOV 2/20/21    Sertraline HCl 50 MG Oral Tab 270 tablet 0 12/18/2020    Sig:   Take 3 tablets (150 mg total) by mouth daily. No future appointments.

## 2021-03-19 ENCOUNTER — TELEMEDICINE (OUTPATIENT)
Dept: FAMILY MEDICINE CLINIC | Facility: CLINIC | Age: 51
End: 2021-03-19

## 2021-03-19 DIAGNOSIS — E03.9 HYPOTHYROIDISM, UNSPECIFIED TYPE: Primary | ICD-10-CM

## 2021-03-19 DIAGNOSIS — R07.89 OTHER CHEST PAIN: ICD-10-CM

## 2021-03-19 DIAGNOSIS — F41.9 ANXIETY: ICD-10-CM

## 2021-03-19 PROCEDURE — 99214 OFFICE O/P EST MOD 30 MIN: CPT | Performed by: FAMILY MEDICINE

## 2021-03-19 RX ORDER — LEVOTHYROXINE SODIUM 0.03 MG/1
25 TABLET ORAL
Qty: 90 TABLET | Refills: 0 | Status: SHIPPED | OUTPATIENT
Start: 2021-03-19 | End: 2021-04-28

## 2021-03-19 NOTE — PROGRESS NOTES
No chief complaint on file. Discussed labs and follow-up on chest pain. This visit is conducted using Telemedicine with live, interactive video and audio.     Patient has been referred to the [facility] website at [email or web address] to review the yearl Procedure Laterality Date   • D & C  2003    Missed AB      Family History   Problem Relation Age of Onset   • Diabetes Father    • Heart Disorder Father    • Stroke Neg    • Heart Disease Neg    • Cancer Neg       Social History: Social History    [place]

## 2021-03-19 NOTE — PATIENT INSTRUCTIONS
Hypothyroidism    You have hypothyroidism. This means your thyroid gland is not making enough thyroid hormone. This hormone is vital to body growth and metabolism. If you don’t make enough, many body processes slow down.  This can cause symptoms throughou your thyroid hormone pill without checking with your provider first.  · Tell your provider if you have any side effects from your medicines that bother you, especially any chest pain or irregular heartbeats.   · Never change the dosage or stop taking your t weight gain, hair loss, dry skin, and feeling cold. It also helps to treat goiter (an enlarged thyroid gland). It is also used to treat some kinds of thyroid cancer along with surgery and other medicines. How should I use this medicine?   Take this medicin · colestipol  · digoxin  · female hormones, like estrogens or progestins and birth control pills, patches, rings, or injections  · iron supplements  · kayexylate  · ketamine  · liquid nutrition products like Ensure  · lithium  · medicines for colds and gutierrez switch brands of this medicine unless your health care professional agrees with the change. Ask questions if you are uncertain. You will need regular exams and occasional blood tests to check the response to treatment.  If you are receiving this medicine f

## 2021-03-24 ENCOUNTER — TELEPHONE (OUTPATIENT)
Dept: FAMILY MEDICINE CLINIC | Facility: CLINIC | Age: 51
End: 2021-03-24

## 2021-03-24 NOTE — TELEPHONE ENCOUNTER
Please get update on her chest pain. She was started on levothyrodine. Any side effect. She will need to follow up in 6 wk for repeat thyroid labs and med check. Come fasting. Schedule her.

## 2021-03-25 NOTE — TELEPHONE ENCOUNTER
FYI    Patient states she only had one episode this week and only lasted about 20 mins. Has not experienced any more chest pain after   Patient not having any side effects from medication. 6 week f/u with fasting labs scheduled.   Future Appointments   Da

## 2021-04-02 ENCOUNTER — PATIENT MESSAGE (OUTPATIENT)
Dept: FAMILY MEDICINE CLINIC | Facility: CLINIC | Age: 51
End: 2021-04-02

## 2021-04-02 NOTE — TELEPHONE ENCOUNTER
From: Chloe President  To: Mallory Alvarez MD  Sent: 4/2/2021 10:46 AM CDT  Subject: Visit Follow-up Question    Dr. Severiano Duster,   I had another episode of pain in the middle of my chest, pain in my lower jaw and pain in my back and both arms.  It lasted abo

## 2021-04-18 DIAGNOSIS — E03.9 HYPOTHYROIDISM, UNSPECIFIED TYPE: ICD-10-CM

## 2021-04-19 RX ORDER — LEVOTHYROXINE SODIUM 0.03 MG/1
25 TABLET ORAL
Qty: 90 TABLET | Refills: 0 | OUTPATIENT
Start: 2021-04-19 | End: 2021-07-18

## 2021-04-19 NOTE — TELEPHONE ENCOUNTER
[facility] Provider Pharmacy         LEVOTHYROXIN TAB 25MCG 03/19/2021  25 30 Undefined  Sally [address] #. ..      Last refill 3/19/21 #90 0 refill  Should still have refill on file

## 2021-04-19 NOTE — TELEPHONE ENCOUNTER
Requesting refills be sent to [facility] instead of local pharmacy. Ok to send?   Last refill to local pharmacy 3/8/21 #270 0 refill  Last OV 3/19/21

## 2021-04-28 ENCOUNTER — PATIENT MESSAGE (OUTPATIENT)
Dept: FAMILY MEDICINE CLINIC | Facility: CLINIC | Age: 51
End: 2021-04-28

## 2021-04-28 DIAGNOSIS — E03.9 HYPOTHYROIDISM, UNSPECIFIED TYPE: ICD-10-CM

## 2021-04-28 RX ORDER — LEVOTHYROXINE SODIUM 0.03 MG/1
25 TABLET ORAL
Qty: 90 TABLET | Refills: 0 | Status: CANCELLED | OUTPATIENT
Start: 2021-04-28 | End: 2021-07-27

## 2021-04-28 RX ORDER — LEVOTHYROXINE SODIUM 0.03 MG/1
25 TABLET ORAL
Qty: 90 TABLET | Refills: 0 | Status: SHIPPED | OUTPATIENT
Start: 2021-04-28 | End: 2021-05-20

## 2021-04-28 NOTE — TELEPHONE ENCOUNTER
From: Gay Geiger  To: Lin Duffy MD  Sent: 4/28/2021 2:40 PM CDT  Subject: Prescription Question    Can you please update my prescription for Levothyroxine to a 90-day supply through the [facility] mail order?  My insurance will no longer cover 30 day

## 2021-04-28 NOTE — TELEPHONE ENCOUNTER
Last refilled on 3/19/21 for # 90 with 0 refills  Last labs TSH  2.020 on 2/20/21  Last OV televisit 3/19/21  Future Appointments   Date Time Provider Walt Valdez   5/8/2021  8:00 AM Mateus Lam MD [facility] [place]        Thank you.

## 2021-04-28 NOTE — TELEPHONE ENCOUNTER
Thyroid Supplements Protocol [id number]/28/2021 03:23 PM   TSH test in past 12 months Protocol Details    TSH value between 0.350 and 5.500 IU/ml     Appointment in past 12 or next 3 months

## 2021-04-29 ENCOUNTER — TELEPHONE (OUTPATIENT)
Dept: OBGYN CLINIC | Facility: CLINIC | Age: 51
End: 2021-04-29

## 2021-04-29 DIAGNOSIS — N93.9 ABNORMAL UTERINE BLEEDING: Primary | ICD-10-CM

## 2021-05-05 NOTE — TELEPHONE ENCOUNTER
----- Message from MD Jose sent at 4/22/2021  1:57 PM CDT -----  Surgeon: Dr. Garcia   Assistant: none     Type of Admit: Hospital outpatient, does not require admission following surgery   Procedure Location: Main OR    PreOp Dx: Abnormal uteri
Patient returning call to schedule surgery
Surgery scheduled for 6/19/2021 at 12:30p  Post op   Future Appointments   Date Time Provider Walt Valdez   5/8/2021  8:00 AM Frank Gaona MD [facility] [place]   7/1/2021  2:45 PM Michael Ruvalcaba MD [facility] [place]     Orders entered  Added
lvm for pt to schedule surgery
none

## 2021-05-15 PROCEDURE — 80061 LIPID PANEL: CPT | Performed by: NURSE PRACTITIONER

## 2021-05-15 PROCEDURE — 84439 ASSAY OF FREE THYROXINE: CPT | Performed by: NURSE PRACTITIONER

## 2021-05-15 PROCEDURE — 84443 ASSAY THYROID STIM HORMONE: CPT | Performed by: NURSE PRACTITIONER

## 2021-05-15 PROCEDURE — 84481 FREE ASSAY (FT-3): CPT | Performed by: NURSE PRACTITIONER

## 2021-05-15 PROCEDURE — 85025 COMPLETE CBC W/AUTO DIFF WBC: CPT | Performed by: NURSE PRACTITIONER

## 2021-05-15 NOTE — PROGRESS NOTES
HPI: HPI   Patient is here for follow up. Due for repeat blood work. At her last visit she discussed worsening anxiety with Dr. Alfred Kaufman. Has felt worse since starting low dose Levothyroxine.  Her anxiety is particularly increased the week prior to her per change, chills, fatigue and fever. Respiratory: Negative for cough. Cardiovascular: Negative for chest pain. Endocrine: Negative for cold intolerance and heat intolerance. Genitourinary: Positive for menstrual problem.    Psychiatric/Behavioral: Po

## 2021-05-17 ENCOUNTER — TELEPHONE (OUTPATIENT)
Dept: FAMILY MEDICINE CLINIC | Facility: CLINIC | Age: 51
End: 2021-05-17

## 2021-05-17 DIAGNOSIS — E78.2 MIXED HYPERLIPIDEMIA: Primary | ICD-10-CM

## 2021-05-17 NOTE — TELEPHONE ENCOUNTER
----- Message from MARITZA Yang sent at 5/17/2021  8:39 AM CDT -----  -Cholesterol improved. Continue lifestyle changes and recheck in 6 months  -Thyroid normalized, continue current dose of Levothyroxine.  Recheck 1 year  -Mildly anemic, has heav

## 2021-05-20 DIAGNOSIS — E03.9 HYPOTHYROIDISM, UNSPECIFIED TYPE: ICD-10-CM

## 2021-05-20 RX ORDER — LEVOTHYROXINE SODIUM 0.03 MG/1
25 TABLET ORAL
Qty: 90 TABLET | Refills: 0 | Status: SHIPPED | OUTPATIENT
Start: 2021-05-20 | End: 2021-07-15

## 2021-05-20 NOTE — TELEPHONE ENCOUNTER
See note below. Ins requires 90 days sent  Last refill 4/28/21 #90 0 refill  Last OV 5/15/21  Ok to send?

## 2021-05-20 NOTE — TELEPHONE ENCOUNTER
Levothyroxine Sodium 25 MCG Oral Tab    [facility] in [place] called, states they need a new rx for 90 day,  There is 30 day left, but insurance will only pay for a 90 day script.

## 2021-06-03 RX ORDER — ACETAMINOPHEN 500 MG
1000 TABLET ORAL ONCE
Status: CANCELLED | OUTPATIENT
Start: 2021-06-03 | End: 2021-06-03

## 2021-06-07 DIAGNOSIS — F41.9 ANXIETY: ICD-10-CM

## 2021-06-07 RX ORDER — BUSPIRONE HYDROCHLORIDE 5 MG/1
TABLET ORAL
Qty: 60 TABLET | Refills: 0 | Status: SHIPPED | OUTPATIENT
Start: 2021-06-07 | End: 2021-07-03

## 2021-06-08 ENCOUNTER — ANESTHESIA EVENT (OUTPATIENT)
Dept: SURGERY | Facility: HOSPITAL | Age: 51
End: 2021-06-08
Payer: COMMERCIAL

## 2021-06-16 ENCOUNTER — LAB ENCOUNTER (OUTPATIENT)
Dept: LAB | Facility: HOSPITAL | Age: 51
End: 2021-06-16
Attending: OBSTETRICS & GYNECOLOGY
Payer: COMMERCIAL

## 2021-06-16 DIAGNOSIS — N93.9 ABNORMAL UTERINE BLEEDING: ICD-10-CM

## 2021-06-19 ENCOUNTER — ANESTHESIA (OUTPATIENT)
Dept: SURGERY | Facility: HOSPITAL | Age: 51
End: 2021-06-19
Payer: COMMERCIAL

## 2021-06-19 ENCOUNTER — HOSPITAL ENCOUNTER (OUTPATIENT)
Facility: HOSPITAL | Age: 51
Setting detail: HOSPITAL OUTPATIENT SURGERY
Discharge: HOME | End: 2021-06-19
Attending: OBSTETRICS & GYNECOLOGY | Admitting: OBSTETRICS & GYNECOLOGY
Payer: COMMERCIAL

## 2021-06-19 VITALS
OXYGEN SATURATION: 100 % | RESPIRATION RATE: 16 BRPM | HEART RATE: 68 BPM | BODY MASS INDEX: 27.87 KG/M2 | WEIGHT: 147.63 LBS | TEMPERATURE: 98 F | SYSTOLIC BLOOD PRESSURE: 120 MMHG | HEIGHT: 61 IN | DIASTOLIC BLOOD PRESSURE: 75 MMHG

## 2021-06-19 DIAGNOSIS — N93.9 ABNORMAL UTERINE BLEEDING: Primary | ICD-10-CM

## 2021-06-19 PROCEDURE — 58563 HYSTEROSCOPY ABLATION: CPT | Performed by: OBSTETRICS & GYNECOLOGY

## 2021-06-19 PROCEDURE — 0U5B8ZZ DESTRUCTION OF ENDOMETRIUM, VIA NATURAL OR ARTIFICIAL OPENING ENDOSCOPIC: ICD-10-PCS | Performed by: OBSTETRICS & GYNECOLOGY

## 2021-06-19 RX ORDER — ONDANSETRON 2 MG/ML
4 INJECTION INTRAMUSCULAR; INTRAVENOUS AS NEEDED
Status: CANCELLED | OUTPATIENT
Start: 2021-06-19 | End: 2021-06-19

## 2021-06-19 RX ORDER — DEXAMETHASONE SODIUM PHOSPHATE 4 MG/ML
4 VIAL (ML) INJECTION AS NEEDED
Status: CANCELLED | OUTPATIENT
Start: 2021-06-19 | End: 2021-06-19

## 2021-06-19 RX ORDER — MIDAZOLAM HYDROCHLORIDE 1 MG/ML
INJECTION INTRAMUSCULAR; INTRAVENOUS AS NEEDED
Status: DISCONTINUED | OUTPATIENT
Start: 2021-06-19 | End: 2021-06-19 | Stop reason: SURG

## 2021-06-19 RX ORDER — DIPHENHYDRAMINE HYDROCHLORIDE 50 MG/ML
12.5 INJECTION INTRAMUSCULAR; INTRAVENOUS AS NEEDED
Status: CANCELLED | OUTPATIENT
Start: 2021-06-19 | End: 2021-06-19

## 2021-06-19 RX ORDER — IBUPROFEN 600 MG/1
600 TABLET ORAL EVERY 6 HOURS PRN
Qty: 20 TABLET | Refills: 0 | Status: SHIPPED | OUTPATIENT
Start: 2021-06-19 | End: 2021-08-16

## 2021-06-19 RX ORDER — MEPERIDINE HYDROCHLORIDE 25 MG/ML
12.5 INJECTION INTRAMUSCULAR; INTRAVENOUS; SUBCUTANEOUS AS NEEDED
Status: CANCELLED | OUTPATIENT
Start: 2021-06-19

## 2021-06-19 RX ORDER — HYDROCODONE BITARTRATE AND ACETAMINOPHEN 5; 325 MG/1; MG/1
2 TABLET ORAL AS NEEDED
Status: CANCELLED | OUTPATIENT
Start: 2021-06-19

## 2021-06-19 RX ORDER — SODIUM CHLORIDE, SODIUM LACTATE, POTASSIUM CHLORIDE, CALCIUM CHLORIDE 600; 310; 30; 20 MG/100ML; MG/100ML; MG/100ML; MG/100ML
INJECTION, SOLUTION INTRAVENOUS CONTINUOUS PRN
Status: DISCONTINUED | OUTPATIENT
Start: 2021-06-19 | End: 2021-06-19 | Stop reason: SURG

## 2021-06-19 RX ORDER — NALOXONE HYDROCHLORIDE 0.4 MG/ML
80 INJECTION, SOLUTION INTRAMUSCULAR; INTRAVENOUS; SUBCUTANEOUS AS NEEDED
Status: CANCELLED | OUTPATIENT
Start: 2021-06-19 | End: 2021-06-19

## 2021-06-19 RX ORDER — KETOROLAC TROMETHAMINE 30 MG/ML
INJECTION, SOLUTION INTRAMUSCULAR; INTRAVENOUS AS NEEDED
Status: DISCONTINUED | OUTPATIENT
Start: 2021-06-19 | End: 2021-06-19 | Stop reason: SURG

## 2021-06-19 RX ORDER — METOCLOPRAMIDE HYDROCHLORIDE 5 MG/ML
10 INJECTION INTRAMUSCULAR; INTRAVENOUS AS NEEDED
Status: CANCELLED | OUTPATIENT
Start: 2021-06-19 | End: 2021-06-19

## 2021-06-19 RX ORDER — HYDROMORPHONE HYDROCHLORIDE 1 MG/ML
0.4 INJECTION, SOLUTION INTRAMUSCULAR; INTRAVENOUS; SUBCUTANEOUS EVERY 5 MIN PRN
Status: CANCELLED | OUTPATIENT
Start: 2021-06-19 | End: 2021-06-19

## 2021-06-19 RX ORDER — MIDAZOLAM HYDROCHLORIDE 1 MG/ML
1 INJECTION INTRAMUSCULAR; INTRAVENOUS EVERY 5 MIN PRN
Status: CANCELLED | OUTPATIENT
Start: 2021-06-19 | End: 2021-06-19

## 2021-06-19 RX ORDER — SODIUM CHLORIDE, SODIUM LACTATE, POTASSIUM CHLORIDE, CALCIUM CHLORIDE 600; 310; 30; 20 MG/100ML; MG/100ML; MG/100ML; MG/100ML
INJECTION, SOLUTION INTRAVENOUS CONTINUOUS
Status: CANCELLED | OUTPATIENT
Start: 2021-06-19

## 2021-06-19 RX ORDER — SODIUM CHLORIDE, SODIUM LACTATE, POTASSIUM CHLORIDE, CALCIUM CHLORIDE 600; 310; 30; 20 MG/100ML; MG/100ML; MG/100ML; MG/100ML
INJECTION, SOLUTION INTRAVENOUS CONTINUOUS
Status: DISCONTINUED | OUTPATIENT
Start: 2021-06-19 | End: 2021-06-19

## 2021-06-19 RX ORDER — HYDROCODONE BITARTRATE AND ACETAMINOPHEN 5; 325 MG/1; MG/1
1 TABLET ORAL AS NEEDED
Status: CANCELLED | OUTPATIENT
Start: 2021-06-19

## 2021-06-19 RX ORDER — LIDOCAINE HYDROCHLORIDE 10 MG/ML
INJECTION, SOLUTION EPIDURAL; INFILTRATION; INTRACAUDAL; PERINEURAL AS NEEDED
Status: DISCONTINUED | OUTPATIENT
Start: 2021-06-19 | End: 2021-06-19 | Stop reason: SURG

## 2021-06-19 RX ADMIN — KETOROLAC TROMETHAMINE 30 MG: 30 INJECTION, SOLUTION INTRAMUSCULAR; INTRAVENOUS at 13:50:00

## 2021-06-19 RX ADMIN — LIDOCAINE HYDROCHLORIDE 50 MG: 10 INJECTION, SOLUTION EPIDURAL; INFILTRATION; INTRACAUDAL; PERINEURAL at 13:11:00

## 2021-06-19 RX ADMIN — SODIUM CHLORIDE, SODIUM LACTATE, POTASSIUM CHLORIDE, CALCIUM CHLORIDE: 600; 310; 30; 20 INJECTION, SOLUTION INTRAVENOUS at 13:09:00

## 2021-06-19 RX ADMIN — MIDAZOLAM HYDROCHLORIDE 2 MG: 1 INJECTION INTRAMUSCULAR; INTRAVENOUS at 13:09:00

## 2021-06-19 NOTE — ANESTHESIA PREPROCEDURE EVALUATION
PRE-OP EVALUATION    Patient Name: Yesy Oliva    Admit Diagnosis: Abnormal uterine bleeding [N93.9]    Pre-op Diagnosis: Abnormal uterine bleeding [N93.9]    dilation, hysteroscopy, hysteroscopic endometrial sampling and misa endometrial abla Neuro/Psych        (+) anxiety           (+) headaches                 Past Surgical History:   Procedure Laterality Date   • D & C  2003    Missed AB     Social History    Tobacco Use      Smoking status: Never Smoker      Smokeless tobacco: Never Used

## 2021-06-19 NOTE — BRIEF OP NOTE
Pre-Operative Diagnosis: Abnormal uterine bleeding [N93.9]     Post-Operative Diagnosis: Abnormal uterine bleeding [N93.9]      Procedure Performed:   dilation, hysteroscopy, hysteroscopic endometrial sampling and misa endometrial ablation with Naveed Mao

## 2021-06-19 NOTE — H&P
[facility]  Obstetrics and Gynecology  History & Physical    Yoselyn Villela Patient Status:  Hospital Outpatient Surgery    1970 MRN [id number]   Location [address] [place] [address] Attending Tomas Marmolejo Day 0 PCP Alcohol/week: 0.0 - 1.0 standard drinks      Comment: OCC      Home Meds: Levothyroxine Sodium 25 MCG Oral Tab, Take 1 tablet (25 mcg total) by mouth before breakfast., Disp: 90 tablet, Rfl: 0  Sertraline HCl 50 MG Oral Tab, Take 4 tablets (200 mg total) b all appropriate consents will be signed at the Hospital. Outpatient procedure is planned for today and anticipated. Follow up in office in 2 weeks following discharge to home.      MD Chuck   [address]

## 2021-06-19 NOTE — OPERATIVE REPORT
OPERATIVE REPORT:    PATIENT: Esteban Mcghee  MRN: [id number]  DATE OF PROCEDURE: 06/19/21    Pre-Operative Diagnosis: Abnormal uterine bleeding [N93.9]     Post-Operative Diagnosis: Abnormal uterine bleeding [N93.9]      Procedure Performed:   Rick Standard lateral surface of the endometrial cavity for a thorough sampling of the endometrium. This tissue was collected and sent to pathology as a separate sample. Attention was then turned towards the endometrial ablation portion of the procedure.  The uterus s condition        [facility]

## 2021-06-19 NOTE — ANESTHESIA POSTPROCEDURE EVALUATION
[address] [facility] Patient Status:  Hospital Outpatient Surgery   Age/Gender 48year old female MRN [id number]   [facility] SURGERY Attending Luis Guevara, [address] [facility] [address] Day # 0 PCP Mallory Alvarez MD       Anesthesia Post-op

## 2021-07-03 DIAGNOSIS — F41.9 ANXIETY: ICD-10-CM

## 2021-07-03 RX ORDER — BUSPIRONE HYDROCHLORIDE 5 MG/1
TABLET ORAL
Qty: 60 TABLET | Refills: 0 | Status: SHIPPED | OUTPATIENT
Start: 2021-07-03 | End: 2021-08-02

## 2021-07-03 NOTE — TELEPHONE ENCOUNTER
LOV: 5/15/21 for anxiety    BUSPIRONE HCL 5 MG Oral Tab 60 tablet 0 6/7/2021    Sig:   TAKE 1 TABLET BY MOUTH TWICE A DAY       Future Appointments   Date Time Provider Walt Valdez   7/9/2021  2:15 PM Ranulfo Richard MD [facility] [employer]

## 2021-07-09 ENCOUNTER — OFFICE VISIT (OUTPATIENT)
Dept: OBGYN CLINIC | Facility: CLINIC | Age: 51
End: 2021-07-09
Payer: COMMERCIAL

## 2021-07-09 VITALS — BODY MASS INDEX: 28 KG/M2 | WEIGHT: 150 LBS | SYSTOLIC BLOOD PRESSURE: 120 MMHG | DIASTOLIC BLOOD PRESSURE: 78 MMHG

## 2021-07-09 DIAGNOSIS — Z98.890 S/P ENDOMETRIAL ABLATION: Primary | ICD-10-CM

## 2021-07-09 PROCEDURE — 3074F SYST BP LT 130 MM HG: CPT | Performed by: OBSTETRICS & GYNECOLOGY

## 2021-07-09 PROCEDURE — 3078F DIAST BP <80 MM HG: CPT | Performed by: OBSTETRICS & GYNECOLOGY

## 2021-07-09 PROCEDURE — 99212 OFFICE O/P EST SF 10 MIN: CPT | Performed by: OBSTETRICS & GYNECOLOGY

## 2021-07-09 NOTE — PROGRESS NOTES
No chief complaint on file. S: Patient is here 2 weeks s/p [facility] endometrial ablation for AUB   She is doing well.    No bleeding  No pain      O: /78   Wt 150 lb (68 kg)   LMP 05/19/2021   BMI 28.34 kg/m²   Gen: NAD  Abdomen: Soft, NT, ND       A:

## 2021-07-15 DIAGNOSIS — E03.9 HYPOTHYROIDISM, UNSPECIFIED TYPE: ICD-10-CM

## 2021-07-15 RX ORDER — LEVOTHYROXINE SODIUM 25 MCG
TABLET ORAL
Qty: 90 TABLET | Refills: 1 | Status: SHIPPED | OUTPATIENT
Start: 2021-07-15 | End: 2021-12-20

## 2021-07-15 NOTE — TELEPHONE ENCOUNTER
LOV: 5/15/21 for anxiety  TSH: 5/15/21      Levothyroxine Sodium 25 MCG Oral Tab 90 tablet 0 5/20/2021 8/18/2021   Sig:   Take 1 tablet (25 mcg total) by mouth before breakfast.         Thyroid Supplements Protocol [id number]/15/2021 08:06 AM   TSH test in p I personally performed the service described in the documentation recorded by the scribe in my presence, and it accurately and completely records my words and actions.

## 2021-08-01 DIAGNOSIS — F41.9 ANXIETY: ICD-10-CM

## 2021-08-02 RX ORDER — BUSPIRONE HYDROCHLORIDE 5 MG/1
TABLET ORAL
Qty: 60 TABLET | Refills: 0 | Status: SHIPPED | OUTPATIENT
Start: 2021-08-02 | End: 2021-08-16

## 2021-09-03 DIAGNOSIS — F41.9 ANXIETY: ICD-10-CM

## 2021-09-03 NOTE — TELEPHONE ENCOUNTER
LMTCB    RX SENT   SERTRALINE 50 MG Oral Tab         Sig: TAKE 3 TABLETS(150MG TOTAL)DAILY    Disp:  270 tablet    Refills:  0    Start: 9/3/2021    Class: Normal    Authorized by: Pedro Collins MD    Non-formulary For: Anxiety        To be filled at: C

## 2021-10-22 ENCOUNTER — TELEPHONE (OUTPATIENT)
Dept: FAMILY MEDICINE CLINIC | Facility: CLINIC | Age: 51
End: 2021-10-22

## 2021-10-22 DIAGNOSIS — Z12.11 SCREENING FOR COLON CANCER: Primary | ICD-10-CM

## 2021-11-01 ENCOUNTER — TELEPHONE (OUTPATIENT)
Dept: FAMILY MEDICINE CLINIC | Facility: CLINIC | Age: 51
End: 2021-11-01

## 2021-11-01 NOTE — TELEPHONE ENCOUNTER
Px due  Letter sent  Future Appointments   Date Time Provider Walt Valdez   11/9/2021  5:40 PM PF SUE RM1 PF MAMMO [place]   12/10/2021  3:15 PM Ronel Schneider MD [id number] [address]

## 2021-11-05 DIAGNOSIS — F41.9 ANXIETY: ICD-10-CM

## 2021-11-08 ENCOUNTER — TELEPHONE (OUTPATIENT)
Dept: FAMILY MEDICINE CLINIC | Facility: CLINIC | Age: 51
End: 2021-11-08

## 2021-11-08 RX ORDER — BUSPIRONE HYDROCHLORIDE 5 MG/1
TABLET ORAL
Qty: 180 TABLET | Refills: 0 | Status: SHIPPED | OUTPATIENT
Start: 2021-11-08 | End: 2021-11-19

## 2021-11-08 NOTE — TELEPHONE ENCOUNTER
Called patient. States migraine issue and fatigue has been going on for a while. No visual changes. When they occur she gets a little dizzy and nauseous. Excedrin sometimes helps.  Advised will let Dr Sweta Granger know and if any other questions will call her ba

## 2021-11-09 ENCOUNTER — HOSPITAL ENCOUNTER (OUTPATIENT)
Dept: MAMMOGRAPHY | Age: 51
Discharge: HOME | End: 2021-11-09
Attending: FAMILY MEDICINE
Payer: COMMERCIAL

## 2021-11-09 DIAGNOSIS — Z12.31 VISIT FOR SCREENING MAMMOGRAM: ICD-10-CM

## 2021-11-09 DIAGNOSIS — Z12.31 ENCOUNTER FOR SCREENING MAMMOGRAM FOR MALIGNANT NEOPLASM OF BREAST: ICD-10-CM

## 2021-11-09 PROCEDURE — 77063 BREAST TOMOSYNTHESIS BI: CPT | Performed by: FAMILY MEDICINE

## 2021-11-09 PROCEDURE — 77067 SCR MAMMO BI INCL CAD: CPT | Performed by: FAMILY MEDICINE

## 2021-11-10 ENCOUNTER — TELEPHONE (OUTPATIENT)
Dept: FAMILY MEDICINE CLINIC | Facility: CLINIC | Age: 51
End: 2021-11-10

## 2021-11-10 NOTE — TELEPHONE ENCOUNTER
----- Message from Quirino Lucas MD sent at 11/10/2021  4:59 PM CST -----  Please inform mammogram neg for maligancy repeat in 1yr.

## 2021-11-19 ENCOUNTER — OFFICE VISIT (OUTPATIENT)
Dept: FAMILY MEDICINE CLINIC | Facility: CLINIC | Age: 51
End: 2021-11-19
Payer: COMMERCIAL

## 2021-11-19 VITALS
DIASTOLIC BLOOD PRESSURE: 70 MMHG | TEMPERATURE: 98 F | OXYGEN SATURATION: 98 % | SYSTOLIC BLOOD PRESSURE: 110 MMHG | BODY MASS INDEX: 28.51 KG/M2 | HEIGHT: 61 IN | HEART RATE: 99 BPM | WEIGHT: 151 LBS | RESPIRATION RATE: 18 BRPM

## 2021-11-19 DIAGNOSIS — E78.2 MIXED HYPERLIPIDEMIA: ICD-10-CM

## 2021-11-19 DIAGNOSIS — Z00.00 ANNUAL PHYSICAL EXAM: Primary | ICD-10-CM

## 2021-11-19 DIAGNOSIS — E03.9 HYPOTHYROIDISM, UNSPECIFIED TYPE: ICD-10-CM

## 2021-11-19 DIAGNOSIS — N92.0 MENORRHAGIA WITH REGULAR CYCLE: ICD-10-CM

## 2021-11-19 DIAGNOSIS — H91.93 BILATERAL HEARING LOSS, UNSPECIFIED HEARING LOSS TYPE: ICD-10-CM

## 2021-11-19 DIAGNOSIS — F41.9 ANXIETY: ICD-10-CM

## 2021-11-19 PROCEDURE — 3008F BODY MASS INDEX DOCD: CPT | Performed by: FAMILY MEDICINE

## 2021-11-19 PROCEDURE — 99396 PREV VISIT EST AGE 40-64: CPT | Performed by: FAMILY MEDICINE

## 2021-11-19 PROCEDURE — 3078F DIAST BP <80 MM HG: CPT | Performed by: FAMILY MEDICINE

## 2021-11-19 PROCEDURE — 3074F SYST BP LT 130 MM HG: CPT | Performed by: FAMILY MEDICINE

## 2021-11-19 RX ORDER — BUSPIRONE HYDROCHLORIDE 5 MG/1
5 TABLET ORAL 2 TIMES DAILY
Qty: 180 TABLET | Refills: 0 | Status: SHIPPED | OUTPATIENT
Start: 2021-11-19

## 2021-11-19 NOTE — PROGRESS NOTES
HPI:   Gus Artbryant is a 46year old female who presents for a complete physical exam.   No concerns today  Left hearing turn up louder can no clear hearing.        Pap  2020  Mammogram 2021  Has gyne: DR. Fabian Fermin Readings from Last 6 Encounte impairment     glasses      Past Surgical History:   Procedure Laterality Date   • D & C  2003    Missed AB   • ENDOMETRIAL ABLATION        Family History   Problem Relation Age of Onset   • Diabetes Father    • Heart Disorder Father    • Stroke Neg    • H complete physical exam.   Pap and pelvic done. Order put in for mammogram. Self breast exam explained.    Fasting BW ordered: Lipids, CMP, CBC., TSH   Screening colonoscopy : . Refer to [address] test givven  Pt' s weight is Body mass index is 28

## 2021-11-19 NOTE — PATIENT INSTRUCTIONS
Exercise for a Healthier Heart     Exercise with a friend. When activity is fun, you're more likely to stick with it. You may wonder how you can improve the health of your heart. If you’re thinking about exercise, you’re on the right track.  You don’t n you enjoy. Walking is one of the easiest things you can do. You can also try swimming, riding a bike, dancing, or taking an exercise class.   Stop exercising and call your doctor if you:  · Have chest pain or feel dizzy or lightheaded  · Feel burning, tight molasses. Cut your usual amount by half. · Use non-sugar sweeteners. Stevia, aspartame, and sucralose can satisfy a sweet tooth without adding calories. · Swap out sugar-filled soda and other drinks. Buy sugar-free or low-calorie beverages.  Remember guerita your food with herbs and flavorings. Try lemon, garlic, and onion, or salt-free herb seasonings. · Limit convenience foods, such as boxed or canned foods and restaurant food. · Read food labels and choose lower-sodium options. Step 3.  Limit sugar  ·

## 2021-11-20 ENCOUNTER — NURSE ONLY (OUTPATIENT)
Dept: FAMILY MEDICINE CLINIC | Facility: CLINIC | Age: 51
End: 2021-11-20
Payer: COMMERCIAL

## 2021-11-20 DIAGNOSIS — Z00.00 ANNUAL PHYSICAL EXAM: ICD-10-CM

## 2021-11-20 DIAGNOSIS — F41.9 ANXIETY: ICD-10-CM

## 2021-11-20 DIAGNOSIS — N92.0 MENORRHAGIA WITH REGULAR CYCLE: ICD-10-CM

## 2021-11-20 DIAGNOSIS — E03.9 HYPOTHYROIDISM, UNSPECIFIED TYPE: ICD-10-CM

## 2021-11-20 DIAGNOSIS — E78.2 MIXED HYPERLIPIDEMIA: ICD-10-CM

## 2021-11-20 PROCEDURE — 80061 LIPID PANEL: CPT | Performed by: FAMILY MEDICINE

## 2021-11-20 PROCEDURE — 80050 GENERAL HEALTH PANEL: CPT | Performed by: FAMILY MEDICINE

## 2021-11-20 PROCEDURE — 84439 ASSAY OF FREE THYROXINE: CPT | Performed by: FAMILY MEDICINE

## 2021-11-20 NOTE — PROGRESS NOTES
Faraz Medina presents today for nurse visit. Labs ordered by Dr Lori Lord. 1 light green, 1 lavender drawn from right ac area with 1 attempt with straight needle. Patient tolerated well. Left office in stable condition.

## 2021-11-22 ENCOUNTER — TELEPHONE (OUTPATIENT)
Dept: FAMILY MEDICINE CLINIC | Facility: CLINIC | Age: 51
End: 2021-11-22

## 2021-11-22 NOTE — TELEPHONE ENCOUNTER
Talked with aptient discuss labs. Recommend we can stop levothyroxine as labs stable repeat in 6 months.  Continue low fat diet and exercise

## 2021-12-07 ENCOUNTER — LAB ENCOUNTER (OUTPATIENT)
Dept: LAB | Age: 51
End: 2021-12-07
Attending: INTERNAL MEDICINE
Payer: COMMERCIAL

## 2021-12-07 DIAGNOSIS — Z01.818 PRE-OP TESTING: ICD-10-CM

## 2021-12-10 PROBLEM — Z12.11 SPECIAL SCREENING FOR MALIGNANT NEOPLASM OF COLON: Status: ACTIVE | Noted: 2021-12-10

## 2021-12-19 DIAGNOSIS — E03.9 HYPOTHYROIDISM, UNSPECIFIED TYPE: ICD-10-CM

## 2021-12-20 RX ORDER — LEVOTHYROXINE SODIUM 0.03 MG/1
TABLET ORAL
Qty: 90 TABLET | Refills: 0 | Status: SHIPPED | OUTPATIENT
Start: 2021-12-20

## 2021-12-20 NOTE — TELEPHONE ENCOUNTER
LOV 11/19/21 for an annual exam.    Thyroid Supplements Protocol Passed 12/19/2021 07:23 AM   Protocol Details  TSH test in past 12 months    TSH value between 0.350 and 5.500 IU/ml    Appointment in past 12 or next 3 months     No future appointments.    R

## 2021-12-27 ENCOUNTER — HOSPITAL ENCOUNTER (OUTPATIENT)
Age: 51
Discharge: HOME | End: 2021-12-27
Payer: COMMERCIAL

## 2021-12-27 VITALS
RESPIRATION RATE: 20 BRPM | TEMPERATURE: 99 F | OXYGEN SATURATION: 98 % | HEART RATE: 103 BPM | DIASTOLIC BLOOD PRESSURE: 83 MMHG | SYSTOLIC BLOOD PRESSURE: 131 MMHG

## 2021-12-27 DIAGNOSIS — Z20.822 EXPOSURE TO CONFIRMED CASE OF COVID-19: Primary | ICD-10-CM

## 2021-12-27 DIAGNOSIS — U07.1 COVID-19: ICD-10-CM

## 2021-12-27 PROCEDURE — 87880 STREP A ASSAY W/OPTIC: CPT | Performed by: NURSE PRACTITIONER

## 2021-12-27 PROCEDURE — 99213 OFFICE O/P EST LOW 20 MIN: CPT | Performed by: NURSE PRACTITIONER

## 2021-12-27 PROCEDURE — U0002 COVID-19 LAB TEST NON-CDC: HCPCS | Performed by: NURSE PRACTITIONER

## 2021-12-27 NOTE — ED PROVIDER NOTES
Patient Seen in: [facility]      History   Patient presents with:  Cough/URI    Stated Complaint: sore throat body aches    Subjective:   27-year-old female presents with flulike symptoms. Is vaccinated for COVID-19.   No shortness of breath o Appearance: She is well-developed. HENT:      Head: Normocephalic. Right Ear: Tympanic membrane and ear canal normal.      Left Ear: Tympanic membrane and ear canal normal.      Nose: Mucosal edema, congestion and rhinorrhea present.       Mouth/Thro [address]  [phone]    In 2 weeks  for recheck          Medications Prescribed:  Current Discharge Medication List

## 2021-12-27 NOTE — ED INITIAL ASSESSMENT (HPI)
Exposed to covid. Saturday, c/o sore throat, head/body aches, ringing in ears, lightheaded/nausea. Denies fevers. Here for covid and strep testing.

## 2022-03-18 RX ORDER — BUSPIRONE HYDROCHLORIDE 5 MG/1
5 TABLET ORAL 2 TIMES DAILY
Qty: 180 TABLET | Refills: 0 | Status: SHIPPED | OUTPATIENT
Start: 2022-03-18

## 2022-03-18 NOTE — TELEPHONE ENCOUNTER
Buspirone Last refilled on 11/19/21 for # 180 with 0 refills  Sertraline 9/3/21 #270 0 refill  Last OV 11/19/21  No future appointments. Thank you.

## 2022-04-26 ENCOUNTER — OFFICE VISIT (OUTPATIENT)
Dept: FAMILY MEDICINE CLINIC | Facility: CLINIC | Age: 52
End: 2022-04-26
Payer: COMMERCIAL

## 2022-04-26 VITALS
HEIGHT: 61 IN | HEART RATE: 82 BPM | BODY MASS INDEX: 28.7 KG/M2 | OXYGEN SATURATION: 98 % | RESPIRATION RATE: 18 BRPM | WEIGHT: 152 LBS | DIASTOLIC BLOOD PRESSURE: 80 MMHG | TEMPERATURE: 98 F | SYSTOLIC BLOOD PRESSURE: 120 MMHG

## 2022-04-26 DIAGNOSIS — R22.1 LOCALIZED SWELLING, MASS AND LUMP, NECK: Primary | ICD-10-CM

## 2022-04-26 PROCEDURE — 3074F SYST BP LT 130 MM HG: CPT | Performed by: FAMILY MEDICINE

## 2022-04-26 PROCEDURE — 99214 OFFICE O/P EST MOD 30 MIN: CPT | Performed by: FAMILY MEDICINE

## 2022-04-26 PROCEDURE — 3008F BODY MASS INDEX DOCD: CPT | Performed by: FAMILY MEDICINE

## 2022-04-26 PROCEDURE — 3079F DIAST BP 80-89 MM HG: CPT | Performed by: FAMILY MEDICINE

## 2022-04-27 ENCOUNTER — HOSPITAL ENCOUNTER (OUTPATIENT)
Dept: ULTRASOUND IMAGING | Age: 52
Discharge: HOME | End: 2022-04-27
Attending: FAMILY MEDICINE
Payer: COMMERCIAL

## 2022-04-27 DIAGNOSIS — R22.1 LOCALIZED SWELLING, MASS AND LUMP, NECK: ICD-10-CM

## 2022-04-27 PROCEDURE — 76536 US EXAM OF HEAD AND NECK: CPT | Performed by: FAMILY MEDICINE

## 2022-04-28 ENCOUNTER — TELEPHONE (OUTPATIENT)
Dept: FAMILY MEDICINE CLINIC | Facility: CLINIC | Age: 52
End: 2022-04-28

## 2022-04-28 ENCOUNTER — TELEPHONE (OUTPATIENT)
Dept: ADMINISTRATIVE | Age: 52
End: 2022-04-28

## 2022-04-28 NOTE — TELEPHONE ENCOUNTER
Please inform ultrasound of neck at the medial body of clavicle does show possible calcification most likely secondary to synovitis (inflammation of the joint).   Did recommend a CT of the clavicle I have placed ordered

## 2022-05-15 DIAGNOSIS — F41.9 ANXIETY: ICD-10-CM

## 2022-08-19 DIAGNOSIS — F41.9 ANXIETY: ICD-10-CM

## 2022-08-20 RX ORDER — BUSPIRONE HYDROCHLORIDE 5 MG/1
TABLET ORAL
Qty: 180 TABLET | Refills: 0 | Status: SHIPPED | OUTPATIENT
Start: 2022-08-20 | End: 2022-11-05

## 2022-09-24 DIAGNOSIS — F41.9 ANXIETY: ICD-10-CM

## 2022-09-24 NOTE — TELEPHONE ENCOUNTER
Last refilled on 5/17/22 for # 270 with 0 refills  Last OV 4/26/22  No future appointments. Thank you.

## 2022-11-05 ENCOUNTER — OFFICE VISIT (OUTPATIENT)
Dept: FAMILY MEDICINE CLINIC | Facility: CLINIC | Age: 52
End: 2022-11-05
Payer: COMMERCIAL

## 2022-11-05 VITALS
DIASTOLIC BLOOD PRESSURE: 70 MMHG | RESPIRATION RATE: 18 BRPM | WEIGHT: 148 LBS | TEMPERATURE: 97 F | SYSTOLIC BLOOD PRESSURE: 110 MMHG | HEART RATE: 95 BPM | HEIGHT: 61 IN | OXYGEN SATURATION: 98 % | BODY MASS INDEX: 27.94 KG/M2

## 2022-11-05 DIAGNOSIS — M89.319 CLAVICLE ENLARGEMENT: ICD-10-CM

## 2022-11-05 DIAGNOSIS — Z12.31 ENCOUNTER FOR SCREENING MAMMOGRAM FOR MALIGNANT NEOPLASM OF BREAST: ICD-10-CM

## 2022-11-05 DIAGNOSIS — E78.2 MIXED HYPERLIPIDEMIA: Primary | ICD-10-CM

## 2022-11-05 DIAGNOSIS — F41.9 ANXIETY: ICD-10-CM

## 2022-11-05 DIAGNOSIS — Z12.39 ENCOUNTER FOR BREAST CANCER SCREENING OTHER THAN MAMMOGRAM: ICD-10-CM

## 2022-11-05 DIAGNOSIS — E03.9 HYPOTHYROIDISM, UNSPECIFIED TYPE: ICD-10-CM

## 2022-11-05 LAB
ALBUMIN SERPL-MCNC: 4 G/DL (ref 3.4–5)
ALBUMIN/GLOB SERPL: 1.3 {RATIO} (ref 1–2)
ALP LIVER SERPL-CCNC: 66 U/L
ALT SERPL-CCNC: 36 U/L
ANION GAP SERPL CALC-SCNC: 7 MMOL/L (ref 0–18)
AST SERPL-CCNC: 25 U/L (ref 15–37)
BILIRUB SERPL-MCNC: 0.7 MG/DL (ref 0.1–2)
BUN BLD-MCNC: 11 MG/DL (ref 7–18)
CALCIUM BLD-MCNC: 8.8 MG/DL (ref 8.5–10.1)
CHLORIDE SERPL-SCNC: 111 MMOL/L (ref 98–112)
CHOLEST SERPL-MCNC: 209 MG/DL (ref ?–200)
CO2 SERPL-SCNC: 22 MMOL/L (ref 21–32)
CREAT BLD-MCNC: 0.73 MG/DL
ERYTHROCYTE [DISTWIDTH] IN BLOOD BY AUTOMATED COUNT: 13.2 %
FASTING PATIENT LIPID ANSWER: YES
FASTING STATUS PATIENT QL REPORTED: YES
GFR SERPLBLD BASED ON 1.73 SQ M-ARVRAT: 99 ML/MIN/1.73M2 (ref 60–?)
GLOBULIN PLAS-MCNC: 3.2 G/DL (ref 2.8–4.4)
GLUCOSE BLD-MCNC: 104 MG/DL (ref 70–99)
HCT VFR BLD AUTO: 40.8 %
HDLC SERPL-MCNC: 48 MG/DL (ref 40–59)
HGB BLD-MCNC: 13.8 G/DL
LDLC SERPL CALC-MCNC: 141 MG/DL (ref ?–100)
MCH RBC QN AUTO: 30.3 PG (ref 26–34)
MCHC RBC AUTO-ENTMCNC: 33.8 G/DL (ref 31–37)
MCV RBC AUTO: 89.5 FL
NONHDLC SERPL-MCNC: 161 MG/DL (ref ?–130)
OSMOLALITY SERPL CALC.SUM OF ELEC: 290 MOSM/KG (ref 275–295)
PLATELET # BLD AUTO: 210 10(3)UL (ref 150–450)
POTASSIUM SERPL-SCNC: 4.2 MMOL/L (ref 3.5–5.1)
PROT SERPL-MCNC: 7.2 G/DL (ref 6.4–8.2)
RBC # BLD AUTO: 4.56 X10(6)UL
SODIUM SERPL-SCNC: 140 MMOL/L (ref 136–145)
T4 FREE SERPL-MCNC: 0.7 NG/DL (ref 0.8–1.7)
TRIGL SERPL-MCNC: 111 MG/DL (ref 30–149)
TSI SER-ACNC: 3.02 MIU/ML (ref 0.36–3.74)
VLDLC SERPL CALC-MCNC: 21 MG/DL (ref 0–30)
WBC # BLD AUTO: 7.9 X10(3) UL (ref 4–11)

## 2022-11-05 PROCEDURE — 3008F BODY MASS INDEX DOCD: CPT | Performed by: FAMILY MEDICINE

## 2022-11-05 PROCEDURE — 99214 OFFICE O/P EST MOD 30 MIN: CPT | Performed by: FAMILY MEDICINE

## 2022-11-05 PROCEDURE — 84439 ASSAY OF FREE THYROXINE: CPT | Performed by: FAMILY MEDICINE

## 2022-11-05 PROCEDURE — 80061 LIPID PANEL: CPT | Performed by: FAMILY MEDICINE

## 2022-11-05 PROCEDURE — 80050 GENERAL HEALTH PANEL: CPT | Performed by: FAMILY MEDICINE

## 2022-11-05 PROCEDURE — 3074F SYST BP LT 130 MM HG: CPT | Performed by: FAMILY MEDICINE

## 2022-11-05 PROCEDURE — 3078F DIAST BP <80 MM HG: CPT | Performed by: FAMILY MEDICINE

## 2022-11-05 RX ORDER — BUSPIRONE HYDROCHLORIDE 5 MG/1
TABLET ORAL
Qty: 180 TABLET | Refills: 0 | Status: SHIPPED | OUTPATIENT
Start: 2022-11-05

## 2022-12-31 ENCOUNTER — PATIENT MESSAGE (OUTPATIENT)
Dept: FAMILY MEDICINE CLINIC | Facility: CLINIC | Age: 52
End: 2022-12-31

## 2022-12-31 ENCOUNTER — HOSPITAL ENCOUNTER (OUTPATIENT)
Dept: MAMMOGRAPHY | Age: 52
Discharge: HOME | End: 2022-12-31
Attending: FAMILY MEDICINE
Payer: COMMERCIAL

## 2022-12-31 DIAGNOSIS — F41.9 ANXIETY: ICD-10-CM

## 2022-12-31 DIAGNOSIS — Z12.31 ENCOUNTER FOR SCREENING MAMMOGRAM FOR MALIGNANT NEOPLASM OF BREAST: ICD-10-CM

## 2022-12-31 PROCEDURE — 77067 SCR MAMMO BI INCL CAD: CPT | Performed by: FAMILY MEDICINE

## 2022-12-31 PROCEDURE — 77063 BREAST TOMOSYNTHESIS BI: CPT | Performed by: FAMILY MEDICINE

## 2022-12-31 NOTE — TELEPHONE ENCOUNTER
From: Sydney Moon  To: Hemant Wright MD  Sent: 12/31/2022 7:03 AM CST  Subject: Sertraline     This medication is not able to be refilled until next year. Can you please update to 90 day supply so I can receive this via mail order? Thank you.

## 2022-12-31 NOTE — TELEPHONE ENCOUNTER
Last refilled on 9/26/22 for # 90 with 0 refills  Last OV 11/5/22  Future Appointments   Date Time Provider Walt Valdez   12/31/2022  9:40 AM PF SUE RM1 PF RAMY Desouza        Thank you.   Requesting 90 day supply per ins

## 2023-01-04 ENCOUNTER — PATIENT MESSAGE (OUTPATIENT)
Dept: FAMILY MEDICINE CLINIC | Facility: CLINIC | Age: 53
End: 2023-01-04

## 2023-01-04 DIAGNOSIS — F41.9 ANXIETY: ICD-10-CM

## 2023-01-04 NOTE — TELEPHONE ENCOUNTER
[id number] [place] [facility] (supporting Alba Chaves MD) Just now (1:38 PM)     Hi I was following up to see if my script for Sertraline can be filled [facility]? I only have a few days supply left. Thank you.     Refill was sent to [facility] on 12/31/22 #90 1 refill

## 2023-01-04 NOTE — TELEPHONE ENCOUNTER
From: Ángel Garzon  To: Nirali Frye MD  Sent: 12/31/2022 7:03 AM CST  Subject: Sertraline     This medication is not able to be refilled until next year. Can you please update to 90 day supply so I can receive this via mail order? Thank you.

## 2023-01-27 DIAGNOSIS — F41.9 ANXIETY: ICD-10-CM

## 2023-01-28 RX ORDER — BUSPIRONE HYDROCHLORIDE 5 MG/1
TABLET ORAL
Qty: 180 TABLET | Refills: 1 | Status: SHIPPED | OUTPATIENT
Start: 2023-01-28

## 2023-03-22 DIAGNOSIS — F41.9 ANXIETY: ICD-10-CM

## 2023-06-13 RX ORDER — SERTRALINE HYDROCHLORIDE 100 MG/1
TABLET, FILM COATED ORAL
Qty: 90 TABLET | Refills: 0 | Status: SHIPPED | OUTPATIENT
Start: 2023-06-13

## 2023-08-02 DIAGNOSIS — F41.9 ANXIETY: ICD-10-CM

## 2023-08-02 RX ORDER — BUSPIRONE HYDROCHLORIDE 5 MG/1
TABLET ORAL
Qty: 180 TABLET | Refills: 0 | Status: SHIPPED | OUTPATIENT
Start: 2023-08-02

## 2023-08-02 NOTE — TELEPHONE ENCOUNTER
Scheduled PX for   Future Appointments   Date Time Provider Walt Taylori   11/6/2023  8:00 AM Michelle Corley MD [facility] [place]

## 2023-08-02 NOTE — TELEPHONE ENCOUNTER
Last OV 11/5/22  Last refilled on 1/28/23 for # 180 with 1 refills  No future appointments. Thank you. Skin normal color for race, warm, dry and intact. No evidence of rash.

## 2023-09-06 NOTE — TELEPHONE ENCOUNTER
LOV 11/5/22 for hyperlipidemia. Future Appointments   Date Time Provider Walt Valdez   11/6/2023  8:00 AM Ivelisse Patrick MD [facility]. to send due to upcoming appt?

## 2023-09-07 RX ORDER — SERTRALINE HYDROCHLORIDE 100 MG/1
TABLET, FILM COATED ORAL
Qty: 90 TABLET | Refills: 0 | Status: SHIPPED | OUTPATIENT
Start: 2023-09-07

## 2023-10-31 DIAGNOSIS — F41.9 ANXIETY: ICD-10-CM

## 2023-10-31 NOTE — TELEPHONE ENCOUNTER
Last visit 11/05/2022  Last refill 08/02/2023  Your appointments       Date & Time Appointment Department [facility])    Nov 06, 2023  8:00 AM CST Physical - Established with Lindy Gonzalez MD [address] [place] [address] [place] ([address])

## 2023-11-02 RX ORDER — BUSPIRONE HYDROCHLORIDE 5 MG/1
TABLET ORAL
Qty: 180 TABLET | Refills: 0 | Status: SHIPPED | OUTPATIENT
Start: 2023-11-02

## 2023-11-06 ENCOUNTER — OFFICE VISIT (OUTPATIENT)
Dept: FAMILY MEDICINE CLINIC | Facility: CLINIC | Age: 53
End: 2023-11-06
Payer: COMMERCIAL

## 2023-11-06 ENCOUNTER — LAB ENCOUNTER (OUTPATIENT)
Dept: LAB | Age: 53
End: 2023-11-06
Attending: FAMILY MEDICINE
Payer: COMMERCIAL

## 2023-11-06 VITALS
HEIGHT: 61 IN | HEART RATE: 100 BPM | SYSTOLIC BLOOD PRESSURE: 120 MMHG | OXYGEN SATURATION: 98 % | DIASTOLIC BLOOD PRESSURE: 80 MMHG | WEIGHT: 152 LBS | RESPIRATION RATE: 18 BRPM | BODY MASS INDEX: 28.7 KG/M2 | TEMPERATURE: 97 F

## 2023-11-06 DIAGNOSIS — E78.2 MIXED HYPERLIPIDEMIA: ICD-10-CM

## 2023-11-06 DIAGNOSIS — Z12.4 CERVICAL CANCER SCREENING: ICD-10-CM

## 2023-11-06 DIAGNOSIS — Z00.00 ANNUAL PHYSICAL EXAM: Primary | ICD-10-CM

## 2023-11-06 DIAGNOSIS — Z12.31 ENCOUNTER FOR SCREENING MAMMOGRAM FOR MALIGNANT NEOPLASM OF BREAST: ICD-10-CM

## 2023-11-06 DIAGNOSIS — Z23 NEED FOR SHINGLES VACCINE: ICD-10-CM

## 2023-11-06 DIAGNOSIS — Z23 NEED FOR TDAP VACCINATION: ICD-10-CM

## 2023-11-06 DIAGNOSIS — Z00.00 ANNUAL PHYSICAL EXAM: ICD-10-CM

## 2023-11-06 LAB
ALBUMIN SERPL-MCNC: 3.8 G/DL (ref 3.4–5)
ALBUMIN/GLOB SERPL: 1 {RATIO} (ref 1–2)
ALP LIVER SERPL-CCNC: 77 U/L
ALT SERPL-CCNC: 56 U/L
ANION GAP SERPL CALC-SCNC: 6 MMOL/L (ref 0–18)
AST SERPL-CCNC: 37 U/L (ref 15–37)
BILIRUB SERPL-MCNC: 1.4 MG/DL (ref 0.1–2)
BUN BLD-MCNC: 11 MG/DL (ref 9–23)
CALCIUM BLD-MCNC: 9.2 MG/DL (ref 8.5–10.1)
CHLORIDE SERPL-SCNC: 109 MMOL/L (ref 98–112)
CHOLEST SERPL-MCNC: 205 MG/DL (ref ?–200)
CO2 SERPL-SCNC: 24 MMOL/L (ref 21–32)
CREAT BLD-MCNC: 0.83 MG/DL
EGFRCR SERPLBLD CKD-EPI 2021: 84 ML/MIN/1.73M2 (ref 60–?)
ERYTHROCYTE [DISTWIDTH] IN BLOOD BY AUTOMATED COUNT: 12.4 %
EST. AVERAGE GLUCOSE BLD GHB EST-MCNC: 120 MG/DL (ref 68–126)
FASTING PATIENT LIPID ANSWER: YES
FASTING STATUS PATIENT QL REPORTED: YES
GLOBULIN PLAS-MCNC: 3.9 G/DL (ref 2.8–4.4)
GLUCOSE BLD-MCNC: 106 MG/DL (ref 70–99)
HBA1C MFR BLD: 5.8 % (ref ?–5.7)
HCT VFR BLD AUTO: 41 %
HDLC SERPL-MCNC: 44 MG/DL (ref 40–59)
HGB BLD-MCNC: 13.8 G/DL
LDLC SERPL CALC-MCNC: 140 MG/DL (ref ?–100)
MCH RBC QN AUTO: 30 PG (ref 26–34)
MCHC RBC AUTO-ENTMCNC: 33.7 G/DL (ref 31–37)
MCV RBC AUTO: 89.1 FL
NONHDLC SERPL-MCNC: 161 MG/DL (ref ?–130)
OSMOLALITY SERPL CALC.SUM OF ELEC: 288 MOSM/KG (ref 275–295)
PLATELET # BLD AUTO: 236 10(3)UL (ref 150–450)
POTASSIUM SERPL-SCNC: 4 MMOL/L (ref 3.5–5.1)
PROT SERPL-MCNC: 7.7 G/DL (ref 6.4–8.2)
RBC # BLD AUTO: 4.6 X10(6)UL
SODIUM SERPL-SCNC: 139 MMOL/L (ref 136–145)
TRIGL SERPL-MCNC: 119 MG/DL (ref 30–149)
TSI SER-ACNC: 1.78 MIU/ML (ref 0.36–3.74)
VLDLC SERPL CALC-MCNC: 22 MG/DL (ref 0–30)
WBC # BLD AUTO: 6.4 X10(3) UL (ref 4–11)

## 2023-11-06 PROCEDURE — 80053 COMPREHEN METABOLIC PANEL: CPT | Performed by: FAMILY MEDICINE

## 2023-11-06 PROCEDURE — 3079F DIAST BP 80-89 MM HG: CPT | Performed by: FAMILY MEDICINE

## 2023-11-06 PROCEDURE — 88175 CYTOPATH C/V AUTO FLUID REDO: CPT | Performed by: FAMILY MEDICINE

## 2023-11-06 PROCEDURE — 80061 LIPID PANEL: CPT | Performed by: FAMILY MEDICINE

## 2023-11-06 PROCEDURE — 99396 PREV VISIT EST AGE 40-64: CPT | Performed by: FAMILY MEDICINE

## 2023-11-06 PROCEDURE — 83036 HEMOGLOBIN GLYCOSYLATED A1C: CPT | Performed by: FAMILY MEDICINE

## 2023-11-06 PROCEDURE — 85027 COMPLETE CBC AUTOMATED: CPT | Performed by: FAMILY MEDICINE

## 2023-11-06 PROCEDURE — 84443 ASSAY THYROID STIM HORMONE: CPT | Performed by: FAMILY MEDICINE

## 2023-11-06 PROCEDURE — 3008F BODY MASS INDEX DOCD: CPT | Performed by: FAMILY MEDICINE

## 2023-11-06 PROCEDURE — 3074F SYST BP LT 130 MM HG: CPT | Performed by: FAMILY MEDICINE

## 2023-11-10 LAB
.: NORMAL
.: NORMAL

## 2023-11-20 ENCOUNTER — HOSPITAL ENCOUNTER (OUTPATIENT)
Age: 53
Discharge: HOME | End: 2023-11-20
Payer: COMMERCIAL

## 2023-11-20 VITALS
TEMPERATURE: 98 F | SYSTOLIC BLOOD PRESSURE: 118 MMHG | DIASTOLIC BLOOD PRESSURE: 75 MMHG | RESPIRATION RATE: 16 BRPM | OXYGEN SATURATION: 98 % | HEART RATE: 109 BPM

## 2023-11-20 DIAGNOSIS — U07.1 COVID-19: Primary | ICD-10-CM

## 2023-11-20 DIAGNOSIS — H92.03 OTALGIA OF BOTH EARS: ICD-10-CM

## 2023-11-20 LAB — SARS-COV-2 RNA RESP QL NAA+PROBE: DETECTED

## 2023-11-20 PROCEDURE — 99213 OFFICE O/P EST LOW 20 MIN: CPT | Performed by: NURSE PRACTITIONER

## 2023-11-20 PROCEDURE — U0002 COVID-19 LAB TEST NON-CDC: HCPCS | Performed by: NURSE PRACTITIONER

## 2023-11-21 ENCOUNTER — TELEMEDICINE (OUTPATIENT)
Dept: FAMILY MEDICINE CLINIC | Facility: CLINIC | Age: 53
End: 2023-11-21
Payer: COMMERCIAL

## 2023-11-21 DIAGNOSIS — U07.1 COVID-19: Primary | ICD-10-CM

## 2023-12-05 NOTE — TELEPHONE ENCOUNTER
Last OV televisit 11/21/23 with Lori   Last refilled on 9/7/23 for # 90 with 0 refills  No future appointments. Thank you.

## 2023-12-07 RX ORDER — SERTRALINE HYDROCHLORIDE 100 MG/1
TABLET, FILM COATED ORAL
Qty: 90 TABLET | Refills: 3 | Status: SHIPPED | OUTPATIENT
Start: 2023-12-07

## 2023-12-20 NOTE — TELEPHONE ENCOUNTER
"Chief Complaint  Annual Exam (Yearly check up/check up. /Joint swelling in hands) and Joint Swelling    Subjective      History of Present Illness  Violeta Gaona is a 45 y.o. female who presents to [facility] with a past medical history of  Past Medical History:   Diagnosis Date    Acid reflux disease     Allergic 2011    Anxiety     Artificial menopause state 12/16/2014    Broken bones 1997    Depression     Hemorrhoids     Night sweats     Osteopenia      Overall feels like she is doing great  No longer taking her anxiety medicine and feels well with this  Tolerating her estrogens well feels like the vaginal estrogen is making a difference for her  Up-to-date on vaccinations  Up-to-date on mammogram  Due for colonoscopy    Objective   Vital Signs:   Vitals:    12/20/23 1615   BP: 98/64   Pulse: 69   Temp: 97.5 °F (36.4 °C)   TempSrc: Temporal   SpO2: 99%   Weight: 64.6 kg (142 lb 6.4 oz)   Height: 165.1 cm (65\")     Body mass index is 23.7 kg/m².    Wt Readings from Last 3 Encounters:   12/20/23 64.6 kg (142 lb 6.4 oz)   12/21/22 62.9 kg (138 lb 9.6 oz)   06/15/22 61.6 kg (135 lb 12.8 oz)     BP Readings from Last 3 Encounters:   12/20/23 98/64   12/21/22 98/64   06/15/22 102/58       Health Maintenance   Topic Date Due    COLORECTAL CANCER SCREENING  Never done    DXA SCAN  09/21/2024    MAMMOGRAM  10/26/2024    ANNUAL PHYSICAL  12/20/2024    TDAP/TD VACCINES (2 - Td or Tdap) 01/30/2028    HEPATITIS C SCREENING  Completed    COVID-19 Vaccine  Completed    INFLUENZA VACCINE  Completed    Pneumococcal Vaccine 0-64  Aged Out       Physical Exam  Vitals reviewed.   Constitutional:       Appearance: Normal appearance. She is well-developed.   HENT:      Head: Normocephalic and atraumatic.      Right Ear: External ear normal.      Left Ear: External ear normal.   Eyes:      Conjunctiva/sclera: Conjunctivae normal.      Pupils: Pupils are equal, round, and reactive to " FYI    Patient had another episode last night around 9:30 chest pain, jaw pain, and right arm pain, gas, some SOB during episode  Episode lasted for 30-45 mins  Symptoms resolved after episode was done.   Patient has been to ER for this in the past.  Nancy Costa light.   Cardiovascular:      Rate and Rhythm: Normal rate and regular rhythm.      Heart sounds: No murmur heard.     No friction rub. No gallop.   Pulmonary:      Effort: Pulmonary effort is normal.      Breath sounds: Normal breath sounds. No wheezing or rhonchi.   Skin:     General: Skin is warm and dry.   Neurological:      Mental Status: She is alert and oriented to person, place, and time.   Psychiatric:         Mood and Affect: Affect normal.         Behavior: Behavior normal.         Thought Content: Thought content normal.            Result Review :  The following data was reviewed by: Radha Finnegan MD on 12/20/2023:      Procedures        Assessment and Plan   Diagnoses and all orders for this visit:    1. Annual physical exam (Primary)  Comments:  Counseled on good self-care overall doing great    2. Artificial menopause state  -     estrogens, conjugated, (Premarin) 0.625 MG tablet; Take 1 tablet by mouth Daily.  Dispense: 90 tablet; Refill: 1    3. Screening for colon cancer  -     Ambulatory Referral For Screening Colonoscopy    Overall doing great Will order screening colonoscopy  Discussed considering trying to decrease Premarin by a very small amount to see how she does    BMI is within normal parameters. No other follow-up for BMI required.         FOLLOW UP  Return in about 1 year (around 12/20/2024).  Patient was given instructions and counseling regarding her condition or for health maintenance advice. Please see specific information pulled into the AVS if appropriate.       Radha Finnegan MD  12/22/23  17:53 EST    CURRENT & DISCONTINUED MEDICATIONS  Current Outpatient Medications   Medication Instructions    Calcium Carbonate 1500 (600 Ca) MG tablet Calcium 600 600 mg calcium (1,500 mg) oral tablet take 2 tablets by oral route daily   Active    cetirizine (zyrTEC) 10 MG tablet Take 1 tab the day before RUSH. Take 1 tab  2 hours prior to RUSH. Take 1 tab 2 hours prior to 1st weekly  visit.    estradiol (VAGIFEM) 10 mcg, Vaginal, 2 Times Weekly    famotidine (PEPCID) 20 MG tablet Take 2 tabs the evening before RUSH; Then take 2 tabs  the day of RUSH therapy    levocetirizine (XYZAL) 5 MG tablet Take 1 tab the day before RUSH. Take 1 tab 2 hours before RUSH.  Take 1 tab 2 hours prior to 1st weekly visit.    montelukast (SINGULAIR) 10 MG tablet Take 1 tab the day before RUSH. Take 2 tabs 2 hours prior to RUSH. Take 1 tab  2 hours prior to 1st weekly visit.    multivitamin (THERAGRAN) tablet tablet Oral, Daily    predniSONE (DELTASONE) 20 MG tablet Take 2 tabs the day before RUSH w/supper. Take 2 tabs by mouth 2 hours prior to RUSH.  Take 1 tab by mouth 2 hours before first weekly visit    Premarin 0.625 mg, Oral, Daily       Medications Discontinued During This Encounter   Medication Reason    estrogens, conjugated, (Premarin) 0.9 MG tablet Reorder

## 2024-01-05 ENCOUNTER — TELEPHONE (OUTPATIENT)
Dept: FAMILY MEDICINE CLINIC | Facility: CLINIC | Age: 54
End: 2024-01-05

## 2024-01-22 DIAGNOSIS — F41.9 ANXIETY: ICD-10-CM

## 2024-01-22 RX ORDER — BUSPIRONE HYDROCHLORIDE 5 MG/1
TABLET ORAL
Qty: 180 TABLET | Refills: 0 | Status: SHIPPED | OUTPATIENT
Start: 2024-01-22

## 2024-01-22 NOTE — TELEPHONE ENCOUNTER
Last OV televisit 11/21/23  Last refilled on 11/2/23 for # 180 with 0 refills  Future Appointments   Date Time Provider Department Center   1/30/2024  5:40 PM PF SUE RM1 PF RAMY Desouza        Thank you.

## 2024-01-30 ENCOUNTER — HOSPITAL ENCOUNTER (OUTPATIENT)
Dept: MAMMOGRAPHY | Age: 54
Discharge: HOME | End: 2024-01-30
Attending: FAMILY MEDICINE
Payer: COMMERCIAL

## 2024-01-30 DIAGNOSIS — Z12.31 ENCOUNTER FOR SCREENING MAMMOGRAM FOR MALIGNANT NEOPLASM OF BREAST: ICD-10-CM

## 2024-01-30 PROCEDURE — 77067 SCR MAMMO BI INCL CAD: CPT | Performed by: FAMILY MEDICINE

## 2024-01-30 PROCEDURE — 77063 BREAST TOMOSYNTHESIS BI: CPT | Performed by: FAMILY MEDICINE

## 2024-02-24 ENCOUNTER — HOSPITAL ENCOUNTER (OUTPATIENT)
Dept: CT IMAGING | Age: 54
Discharge: HOME | End: 2024-02-24
Attending: FAMILY MEDICINE

## 2024-02-24 VITALS — HEIGHT: 61 IN | WEIGHT: 152 LBS | BODY MASS INDEX: 28.7 KG/M2

## 2024-02-24 DIAGNOSIS — Z13.9 SPECIAL SCREENING: ICD-10-CM

## 2024-02-24 NOTE — PROGRESS NOTES
Date of Service 2/24/2024    THERESA BURRELL  Date of Birth 7/6/1970    Patient Age: 53 year old    PCP: Nanci Villaseñor MD  [address] [address]    Heart Scan Consult  Preliminary Heart Scan Score: 5.93    Previous Screening  Heart Scan Completed Previously: Yes  Year of last heart scan: 2018  Score of last heart scan: 0             Risk Factors  Personal Risk Factors  Non-alterable Risk Factors: Family History  Alterable Risk Factors: Lack of exercise;Stress;Abnormal Cholesterol;Unhealthy eating      Body Mass Index  Body mass index is 28.72 kg/m².    Blood Pressure  Blood Pressure measurement declined during this encounter.  (Normal =< 120/80,  Elevated = 120-129/ >80,  High Stage1 130-139/80-89 , Stage2 >140/>90)    Lipid Profile  Cholesterol: 205, done on 11/6/2023.  HDL Cholesterol: 44, done on 11/6/2023.  LDL Cholesterol: 140, done on 11/6/2023.  TriGlycerides 119, done on 11/6/2023.    Pt is not on any cholesterol medication.    Cholesterol Goals  Value   Total  =< 200   HDL  = > 45 Men = > 55 Women   LDL   =< 100   Triglycerides  =< 150       Glucose and Hemoglobin A1C  Lab Results   Component Value Date     (H) 11/06/2023    A1C 5.8 (H) 11/06/2023     (Normal Fasting Glucose < 100mg/dl )    Nurse Review  Risk factor information and results reviewed with Nurse: Yes    Recommended Follow Up:  Consult your physician regarding:: Final Heart Scan Report;Discuss potential for Incidental Finding;Cholesterol Results (Fasting);Glucose (Fasting)      Recommendations for Change:  Nutrition Changes: Low Saturated Fat;Low Fat Dairy    Cholesterol Modification (goal of therapy depends upon your risk): Decrease LDL (Lousy/Bad) Ideal <100;Decrease Total Normal <200    Exercise: Initiate Program: encouraged 30-45 min aerobic exercise         Weight Management: Decrease Current Weight: goal of BMI </= 25    Stress Management: Adopt Stress Management Techniques: exercise regimen, deep  breathing/relaxation techniques.    Repeat Heart Scan: 3 Years if Calcium Score is > 0.0;Discuss with your Physician              [place] Recommended Resources:  Recommended Resources: PV Screening  Recommended PV Screening: Carotids  Other Resources:: brochure given, pt to consider scheduling      Nisha RODGERS RN        Please Contact the Nurse Heart Line with any Questions or Concerns [phone].

## 2024-02-26 ENCOUNTER — TELEPHONE (OUTPATIENT)
Dept: FAMILY MEDICINE CLINIC | Facility: CLINIC | Age: 54
End: 2024-02-26

## 2024-02-26 DIAGNOSIS — R93.1 ELEVATED CORONARY ARTERY CALCIUM SCORE: Primary | ICD-10-CM

## 2024-02-26 NOTE — TELEPHONE ENCOUNTER
----- Message from Nanci Villaseñor MD sent at 2/26/2024  1:01 PM CST -----  Results reviewed. Please inform patient CT scan overread result is overall looks good.  Her calcium score is 6 which means mild artherosclerotic plague. I know her last lipid was in nov 2023 so recommend another lipid panel and low carb low fat diet and exercise. Depending on her lipid panel we may consider meds if needed since she has mild plaque.

## 2024-04-14 DIAGNOSIS — F41.9 ANXIETY: ICD-10-CM

## 2024-04-15 RX ORDER — BUSPIRONE HYDROCHLORIDE 5 MG/1
TABLET ORAL
Qty: 180 TABLET | Refills: 0 | Status: SHIPPED | OUTPATIENT
Start: 2024-04-15

## 2024-04-15 NOTE — TELEPHONE ENCOUNTER
Request for BUSPIRONE 5 MG Oral Tab     LOV 11/6/23 with    Last refill 1/22/24 180 tablet   NO future khanh

## 2024-07-20 DIAGNOSIS — F41.9 ANXIETY: ICD-10-CM

## 2024-07-20 RX ORDER — BUSPIRONE HYDROCHLORIDE 5 MG/1
TABLET ORAL
Qty: 180 TABLET | Refills: 1 | Status: SHIPPED | OUTPATIENT
Start: 2024-07-20

## 2024-07-20 NOTE — TELEPHONE ENCOUNTER
LOV: 11/21/23 for COVID    BUSPIRONE 5 MG Oral Tab  TAKE 1 TABLET TWICE A DAY Dispense: 180 tablet, Refills: 0 ordered        04/15/2024     No future appointments.

## 2024-11-22 NOTE — TELEPHONE ENCOUNTER
Last Office Visit: 11/6/23  Last Refill: 12/7/23    Patient needs physical and med check, please call

## 2024-11-22 NOTE — TELEPHONE ENCOUNTER
Scheduled   Future Appointments   Date Time Provider Department Center   12/5/2024  4:00 PM Hailey Rebolledo DO [facility] [place]

## 2024-11-23 RX ORDER — SERTRALINE HYDROCHLORIDE 100 MG/1
TABLET, FILM COATED ORAL
Qty: 90 TABLET | Refills: 3 | OUTPATIENT
Start: 2024-11-23

## 2024-12-03 LAB
AMB EXT ANION GAP: 9
AMB EXT BILIRUBIN, TOTAL: 0.6 MG/DL
AMB EXT BUN: 11 MG/DL
AMB EXT CALCIUM: 9.3
AMB EXT CARBON DIOXIDE: 26
AMB EXT CHLORIDE: 103
AMB EXT CHOL/HDL RATIO: 4.7
AMB EXT CHOLESTEROL, TOTAL: 206 MG/DL
AMB EXT CMP ALT: 19 U/L
AMB EXT CMP AST: 21 U/L
AMB EXT CREATININE: 0.64 MG/DL
AMB EXT GLUCOSE: 86 MG/DL
AMB EXT HDL CHOLESTEROL: 44 MG/DL
AMB EXT HEMATOCRIT: 40.2
AMB EXT HEMOGLOBIN: 13.2
AMB EXT HGBA1C: 5.9 %
AMB EXT LDL CHOLESTEROL, DIRECT: 128 MG/DL
AMB EXT MCV: 89.1
AMB EXT NON HDL CHOL: 162 MG/DL
AMB EXT PLATELETS: 231
AMB EXT POSTASSIUM: 3.8 MMOL/L
AMB EXT SODIUM: 138 MMOL/L
AMB EXT TOTAL PROTEIN: 7.2
AMB EXT TRIGLYCERIDES: 204 MG/DL
AMB EXT TSH: 6.11 MIU/ML
AMB EXT VITAMIN D, 25-HYDROXY: 18.6
AMB EXT WBC: 8.2 X10(3)UL

## 2024-12-04 RX ORDER — SERTRALINE HYDROCHLORIDE 100 MG/1
100 TABLET, FILM COATED ORAL DAILY
Qty: 30 TABLET | Refills: 0 | Status: SHIPPED | OUTPATIENT
Start: 2024-12-04 | End: 2025-01-03

## 2024-12-04 NOTE — TELEPHONE ENCOUNTER
Sertraline last refilled 12/7/23  No future appointment in office  LV telemed with Lucia 11/21/23      Psychiatric Non-Scheduled (Anti-Anxiety) [id number]/04/2024 06:46 AM   Protocol Details In person appointment or virtual visit in the past 6 mos or appointment in next 3 mos    Depression Screening completed within the past 12 months

## 2024-12-04 NOTE — TELEPHONE ENCOUNTER
Future Appointments   Date Time Provider Department Center   12/12/2024  7:40 AM Irish Antunez APRN [facility] [place]

## 2024-12-07 ENCOUNTER — TELEPHONE (OUTPATIENT)
Dept: FAMILY MEDICINE CLINIC | Facility: CLINIC | Age: 54
End: 2024-12-07

## 2024-12-07 NOTE — TELEPHONE ENCOUNTER
Received external labs - labs entered in [facility] in your sign bin for review   Please advise on recommendation

## 2024-12-13 NOTE — TELEPHONE ENCOUNTER
Scheduled   Future Appointments   Date Time Provider Department Center   12/16/2024  5:40 PM Nanci Villaseñor MD [facility] [place]   12/23/2024  1:20 PM Irish Antunez APRN [facility] [place]

## 2024-12-16 ENCOUNTER — TELEMEDICINE (OUTPATIENT)
Dept: FAMILY MEDICINE CLINIC | Facility: CLINIC | Age: 54
End: 2024-12-16
Payer: COMMERCIAL

## 2024-12-16 DIAGNOSIS — E55.9 VITAMIN D DEFICIENCY: ICD-10-CM

## 2024-12-16 DIAGNOSIS — R79.89 ELEVATED TSH: Primary | ICD-10-CM

## 2024-12-16 RX ORDER — ERGOCALCIFEROL 1.25 MG/1
50000 CAPSULE, LIQUID FILLED ORAL WEEKLY
Qty: 12 CAPSULE | Refills: 0 | Status: SHIPPED | OUTPATIENT
Start: 2024-12-16 | End: 2025-03-04

## 2024-12-23 ENCOUNTER — LAB ENCOUNTER (OUTPATIENT)
Dept: LAB | Age: 54
End: 2024-12-23
Attending: NURSE PRACTITIONER
Payer: COMMERCIAL

## 2024-12-23 ENCOUNTER — OFFICE VISIT (OUTPATIENT)
Dept: FAMILY MEDICINE CLINIC | Facility: CLINIC | Age: 54
End: 2024-12-23
Payer: COMMERCIAL

## 2024-12-23 VITALS
WEIGHT: 150 LBS | TEMPERATURE: 98 F | HEART RATE: 67 BPM | OXYGEN SATURATION: 99 % | DIASTOLIC BLOOD PRESSURE: 84 MMHG | SYSTOLIC BLOOD PRESSURE: 124 MMHG | BODY MASS INDEX: 27.6 KG/M2 | HEIGHT: 62 IN

## 2024-12-23 DIAGNOSIS — Z12.31 ENCOUNTER FOR SCREENING MAMMOGRAM FOR MALIGNANT NEOPLASM OF BREAST: ICD-10-CM

## 2024-12-23 DIAGNOSIS — R79.89 ELEVATED TSH: ICD-10-CM

## 2024-12-23 DIAGNOSIS — E78.2 MIXED HYPERLIPIDEMIA: ICD-10-CM

## 2024-12-23 DIAGNOSIS — E55.9 VITAMIN D DEFICIENCY: ICD-10-CM

## 2024-12-23 DIAGNOSIS — F41.9 ANXIETY: ICD-10-CM

## 2024-12-23 DIAGNOSIS — Z00.00 GENERAL MEDICAL EXAM: Primary | ICD-10-CM

## 2024-12-23 DIAGNOSIS — R73.03 PREDIABETES: ICD-10-CM

## 2024-12-23 LAB — TSI SER-ACNC: 3.99 UIU/ML (ref 0.55–4.78)

## 2024-12-23 PROCEDURE — 36415 COLL VENOUS BLD VENIPUNCTURE: CPT

## 2024-12-23 PROCEDURE — 84443 ASSAY THYROID STIM HORMONE: CPT

## 2024-12-23 RX ORDER — ESTRADIOL 10 UG/1
INSERT VAGINAL
COMMUNITY
Start: 2024-12-12

## 2024-12-23 RX ORDER — SERTRALINE HYDROCHLORIDE 100 MG/1
100 TABLET, FILM COATED ORAL DAILY
Qty: 90 TABLET | Refills: 3 | Status: SHIPPED | OUTPATIENT
Start: 2024-12-23 | End: 2025-03-23

## 2024-12-23 NOTE — PROGRESS NOTES
HPI:   Patient is here for physical.    Concerns:   Wasn't fasting for recent lab work     Last Pap: 12/2024, unsatisfactory  Last Mammogram: 1/2024  Last Colon Cancer Screen: 12/2021, next needed in 2026    Exercise: not currently  Diet: not good    Wt Readings from Last 6 Encounters:   12/23/24 150 lb (68 kg)   02/24/24 152 lb (68.9 kg)   11/06/23 152 lb (68.9 kg)   11/05/22 148 lb (67.1 kg)   04/26/22 152 lb (68.9 kg)   11/19/21 151 lb (68.5 kg)     Body mass index is 27.44 kg/m².     Cholesterol, Total (mg/dL)   Date Value   12/03/2024 206   11/06/2023 205 (H)   11/05/2022 209 (H)   11/20/2021 204 (H)     CHOLESTEROL (mg/dL)   Date Value   11/21/2013 156   11/02/2012 147     HDL Cholesterol (mg/dL)   Date Value   12/03/2024 44   11/06/2023 44   11/05/2022 48   11/20/2021 48     HDL CHOL (mg/dL)   Date Value   11/21/2013 53   11/02/2012 60     LDL Cholesterol (mg/dL)   Date Value   11/06/2023 140 (H)   11/05/2022 141 (H)   11/20/2021 141 (H)     LDL CHOLESTROL (mg/dL)   Date Value   11/21/2013 79   11/02/2012 82     AST (U/L)   Date Value   12/03/2024 21   11/06/2023 37   11/05/2022 25   11/20/2021 26   11/21/2013 18   11/02/2012 19   03/03/2012 20     ALT (U/L)   Date Value   12/03/2024 19   11/06/2023 56   11/05/2022 36   11/20/2021 35   11/21/2013 20   11/02/2012 27   03/03/2012 25        Current Outpatient Medications   Medication Sig Dispense Refill    VAGIFEM 10 MCG Vaginal Tab       sertraline 100 MG Oral Tab Take 1 tablet (100 mg total) by mouth daily. 90 tablet 3    ergocalciferol 1.25 MG (30655 UT) Oral Cap Take 1 capsule (50,000 Units total) by mouth once a week for 12 doses. 12 capsule 0    busPIRone 5 MG Oral Tab TAKE 1 TABLET TWICE A  tablet 1    Multiple Vitamins-Iron (MULTI-DAY PLUS IRON) Oral Tab Take by mouth.        Past Medical History:    Anxiety    Body piercing    Chronic rhinitis    Decorative tattoo    Disorder of thyroid    Migraines    Seasonal allergies    Visual impairment     glasses    Wears glasses      Past Surgical History:   Procedure Laterality Date    Colonoscopy  2021    Dr. Myrick repeat in 5 -7 yrs    D & c  2003    Missed AB    Endometrial ablation        Family History   Problem Relation Age of Onset    Diabetes Father     Heart Disorder Father     Stroke Neg     Heart Disease Neg     Cancer Neg       Social History:   Social History     Socioeconomic History    Marital status:    Tobacco Use    Smoking status: Never    Smokeless tobacco: Never    Tobacco comments:     NON-SMOKER   Vaping Use    Vaping status: Never Used   Substance and Sexual Activity    Alcohol use: Yes     Alcohol/week: 2.0 standard drinks of alcohol     Types: 1 Glasses of wine, 1 Standard drinks or equivalent per week     Comment: OCC    Drug use: No    Sexual activity: Yes     Partners: Male     Birth control/protection: Tubal Ligation   Other Topics Concern    Caffeine Concern Yes     Comment: 2 diet cokes per day    Exercise Yes     Comment: walking, 6-7 x week    Seat Belt Yes        REVIEW OF SYSTEMS:   Review of Systems   Constitutional:  Negative for chills, fatigue, fever and unexpected weight change.   HENT:  Negative for congestion, ear pain, postnasal drip, rhinorrhea, sore throat and trouble swallowing.    Eyes:  Negative for visual disturbance.   Respiratory:  Negative for cough and shortness of breath.    Cardiovascular:  Negative for chest pain and palpitations.   Gastrointestinal:  Negative for abdominal pain, blood in stool, constipation, diarrhea, nausea and vomiting.   Endocrine: Negative for cold intolerance, heat intolerance, polydipsia, polyphagia and polyuria.   Genitourinary:  Negative for dysuria, frequency, hematuria and pelvic pain.   Musculoskeletal:  Negative for back pain.   Skin:  Negative for rash.   Neurological:  Negative for headaches.   Hematological:  Does not bruise/bleed easily.   Psychiatric/Behavioral:  Negative for dysphoric mood and sleep disturbance.  The patient is not nervous/anxious.        EXAM:   /84   Pulse 67   Temp 97.7 °F (36.5 °C)   Ht 5' 2\" (1.575 m)   Wt 150 lb (68 kg)   LMP 11/15/2024 (Exact Date)   SpO2 99%   BMI 27.44 kg/m²   Patient weight not recorded   Physical Exam  Constitutional:       General: She is not in acute distress.     Appearance: She is well-developed, well-groomed and normal weight. She is not ill-appearing.   HENT:      Right Ear: Tympanic membrane, ear canal and external ear normal.      Left Ear: Tympanic membrane, ear canal and external ear normal.      Nose: Nose normal.      Mouth/Throat:      Mouth: Mucous membranes are moist.      Pharynx: Oropharynx is clear.   Eyes:      Conjunctiva/sclera: Conjunctivae normal.      Pupils: Pupils are equal, round, and reactive to light.   Neck:      Thyroid: No thyromegaly.   Cardiovascular:      Rate and Rhythm: Normal rate and regular rhythm.      Heart sounds: Normal heart sounds.   Pulmonary:      Effort: Pulmonary effort is normal.      Breath sounds: Normal breath sounds.   Abdominal:      General: Bowel sounds are normal.      Palpations: Abdomen is soft.      Tenderness: There is no abdominal tenderness.   Musculoskeletal:         General: Normal range of motion.      Cervical back: Normal range of motion.   Skin:     General: Skin is warm and dry.   Neurological:      General: No focal deficit present.      Mental Status: She is alert and oriented to person, place, and time.   Psychiatric:         Mood and Affect: Mood normal.         ASSESSMENT AND PLAN:   Diagnoses and all orders for this visit:    General medical exam    Encounter for screening mammogram for malignant neoplasm of breast  -     [facility] MOOSE 2D+3D SCREENING BILAT (CPT=77067/37706); Future    Elevated TSH    Prediabetes    Vitamin D deficiency    Mixed hyperlipidemia  -     Lipid Panel [E]; Future    Anxiety  -     sertraline 100 MG Oral Tab; Take 1 tablet (100 mg total) by mouth daily.    Reviewed  recent lab work  Patient was not fasting for lipid level. Will work on diet and recheck fasting lipid in 6 months  Anxiety well-controlled, Continue present management  Has held multivitamin for 1 week, recheck thyroid function today  Declines vaccinations

## 2024-12-30 NOTE — PROGRESS NOTES
No chief complaint on file.   Discuss labs  This visit is conducted using Telemedicine with live, interactive video and audio.    Patient has been referred to the [facility] website at [email or web address] to review the yearly Consent to Treat document.    Patient understands and accepts financial responsibility for any deductible, co-insurance and/or co-pays associated with this service.        HPI:    Patient ID: Jennifer Schroeder is a 54 year old female.    HPI   Tsh elevated repeat in few wks  Vit d is low meds sent   Review of Systems  Neg chest pain sob      Current Outpatient Medications   Medication Sig Dispense Refill    ergocalciferol 1.25 MG (29814 UT) Oral Cap Take 1 capsule (50,000 Units total) by mouth once a week for 12 doses. 12 capsule 0    VAGIFEM 10 MCG Vaginal Tab       sertraline 100 MG Oral Tab Take 1 tablet (100 mg total) by mouth daily. 90 tablet 3    busPIRone 5 MG Oral Tab TAKE 1 TABLET TWICE A  tablet 1    Multiple Vitamins-Iron (MULTI-DAY PLUS IRON) Oral Tab Take by mouth.       Allergies:Allergies[1]    HISTORY:  Past Medical History:    Anxiety    Body piercing    Chronic rhinitis    Decorative tattoo    Disorder of thyroid    Migraines    Seasonal allergies    Visual impairment    glasses    Wears glasses      Past Surgical History:   Procedure Laterality Date    Colonoscopy  2021    Dr. Myrick repeat in 5 -7 yrs    D & c  2003    Missed AB    Endometrial ablation        Family History   Problem Relation Age of Onset    Diabetes Father     Heart Disorder Father     Stroke Neg     Heart Disease Neg     Cancer Neg       Social History:   Social History     Socioeconomic History    Marital status:    Tobacco Use    Smoking status: Never    Smokeless tobacco: Never    Tobacco comments:     NON-SMOKER   Vaping Use    Vaping status: Never Used   Substance and Sexual Activity    Alcohol use: Yes     Alcohol/week: 2.0 standard drinks of alcohol     Types: 1 Glasses of wine, 1  Standard drinks or equivalent per week     Comment: OCC    Drug use: No    Sexual activity: Yes     Partners: Male     Birth control/protection: Tubal Ligation   Other Topics Concern    Caffeine Concern Yes     Comment: 2 diet cokes per day    Exercise Yes     Comment: walking, 6-7 x week    Seat Belt Yes        PHYSICAL EXAM:    LMP 01/08/2024 (Exact Date)    Physical Exam  Constitutional:       General: She is not in acute distress.     Appearance: She is not ill-appearing.   Neurological:      Mental Status: She is alert.              ASSESSMENT/PLAN:   1. Elevated TSH  Repeat labs  - TSH W Reflex To Free T4; Future    2. Vitamin D deficiency  Meds sent  - ergocalciferol 1.25 MG (18346 UT) Oral Cap; Take 1 capsule (50,000 Units total) by mouth once a week for 12 doses.  Dispense: 12 capsule; Refill: 0             No follow-ups on file.            [1] No Known Allergies

## 2025-01-05 DIAGNOSIS — F41.9 ANXIETY: ICD-10-CM

## 2025-01-06 RX ORDER — SERTRALINE HYDROCHLORIDE 100 MG/1
100 TABLET, FILM COATED ORAL DAILY
Qty: 30 TABLET | Refills: 0 | OUTPATIENT
Start: 2025-01-06

## 2025-01-16 DIAGNOSIS — F41.9 ANXIETY: ICD-10-CM

## 2025-01-16 RX ORDER — BUSPIRONE HYDROCHLORIDE 5 MG/1
TABLET ORAL
Qty: 180 TABLET | Refills: 1 | Status: SHIPPED | OUTPATIENT
Start: 2025-01-16

## 2025-01-16 NOTE — TELEPHONE ENCOUNTER
Buspirone last refilled 7/20/24  No future appointment in office  No protocol on medication  LOV with Lucia 12/23/24

## 2025-02-01 ENCOUNTER — HOSPITAL ENCOUNTER (OUTPATIENT)
Dept: MAMMOGRAPHY | Age: 55
Discharge: HOME | End: 2025-02-01
Attending: NURSE PRACTITIONER
Payer: COMMERCIAL

## 2025-02-01 DIAGNOSIS — Z12.31 ENCOUNTER FOR SCREENING MAMMOGRAM FOR MALIGNANT NEOPLASM OF BREAST: ICD-10-CM

## 2025-02-01 PROCEDURE — 77063 BREAST TOMOSYNTHESIS BI: CPT | Performed by: NURSE PRACTITIONER

## 2025-02-01 PROCEDURE — 77067 SCR MAMMO BI INCL CAD: CPT | Performed by: NURSE PRACTITIONER

## 2025-02-03 ENCOUNTER — TELEPHONE (OUTPATIENT)
Dept: FAMILY MEDICINE CLINIC | Facility: CLINIC | Age: 55
End: 2025-02-03

## 2025-02-03 DIAGNOSIS — R92.343 EXTREMELY DENSE TISSUE OF BOTH BREASTS ON MAMMOGRAPHY: Primary | ICD-10-CM

## 2025-02-03 NOTE — TELEPHONE ENCOUNTER
----- Message from Irish Antunez sent at 2/3/2025  8:15 AM CST -----  Results reviewed.   Additional views needed of right breast, will be contacted by mammography dept to schedule

## 2025-02-03 NOTE — TELEPHONE ENCOUNTER
I was looking back at prior results and going back to 2021 it looks like due to my breast density a supplemental screening Molecular Breast Imaging is needed. Can my chart be updated to reflect this additional information so when I go for my yearly mammograms this additional test can be added as well? Please advise if this can be added for future testing? Thank you.

## 2025-02-21 ENCOUNTER — HOSPITAL ENCOUNTER (OUTPATIENT)
Dept: MAMMOGRAPHY | Facility: HOSPITAL | Age: 55
Discharge: HOME | End: 2025-02-21
Attending: NURSE PRACTITIONER
Payer: COMMERCIAL

## 2025-02-21 ENCOUNTER — TELEPHONE (OUTPATIENT)
Dept: FAMILY MEDICINE CLINIC | Facility: CLINIC | Age: 55
End: 2025-02-21

## 2025-02-21 DIAGNOSIS — R92.2 INCONCLUSIVE MAMMOGRAM: ICD-10-CM

## 2025-02-21 PROCEDURE — 77065 DX MAMMO INCL CAD UNI: CPT | Performed by: NURSE PRACTITIONER

## 2025-02-21 PROCEDURE — 77061 BREAST TOMOSYNTHESIS UNI: CPT | Performed by: NURSE PRACTITIONER

## 2025-02-21 PROCEDURE — 76642 ULTRASOUND BREAST LIMITED: CPT | Performed by: NURSE PRACTITIONER

## 2025-02-21 NOTE — TELEPHONE ENCOUNTER
----- Message from Irish Antunez sent at 2/21/2025 10:49 AM CST -----  Results reviewed.   Benign appearing simple cyst in right breast  Recommend screening mammogram in 1 year

## 2025-07-08 DIAGNOSIS — F41.9 ANXIETY: ICD-10-CM

## 2025-07-08 RX ORDER — BUSPIRONE HYDROCHLORIDE 5 MG/1
5 TABLET ORAL 2 TIMES DAILY
Qty: 180 TABLET | Refills: 1 | Status: SHIPPED | OUTPATIENT
Start: 2025-07-08

## 2025-07-08 NOTE — TELEPHONE ENCOUNTER
Last office visit 12/23/24  Last refilled on 1/16/25 for # 180 with 1 refills  No future appointments.     Thank you.

## 2025-07-23 ENCOUNTER — TELEPHONE (OUTPATIENT)
Dept: FAMILY MEDICINE CLINIC | Facility: CLINIC | Age: 55
End: 2025-07-23

## (undated) DIAGNOSIS — F41.9 ANXIETY: ICD-10-CM

## (undated) DIAGNOSIS — M89.319 CLAVICLE ENLARGEMENT: Primary | ICD-10-CM

## (undated) DEVICE — DEV REMOVAL TRUCLEAR SFT MINI

## (undated) DEVICE — Device

## (undated) DEVICE — SCD SLEEVE KNEE HI BLEND

## (undated) DEVICE — HYSTEROSCOPIC INFLOW TUBE SET

## (undated) DEVICE — SOL  .9 3000ML

## (undated) DEVICE — STERILE POLYISOPRENE POWDER-FREE SURGICAL GLOVES: Brand: PROTEXIS

## (undated) DEVICE — SOL  .9 1000ML BTL

## (undated) DEVICE — SEAL TRUCLEAR  HYSTERSCOP

## (undated) DEVICE — MEDI-VAC NON-CONDUCTIVE SUCTION TUBING: Brand: CARDINAL HEALTH

## (undated) DEVICE — GYN CDS: Brand: MEDLINE INDUSTRIES, INC.

## (undated) DEVICE — OUTFLOW HYSTER S&N

## (undated) DEVICE — 2000CC GUARDIAN II: Brand: GUARDIAN

## (undated) DEVICE — SPECIMEN SOCK - STANDARD: Brand: MEDI-VAC

## (undated) NOTE — LETTER
11/1/2021  Loi Schroeder  [address] [facility] [place] [address]    We take each of our patient's health very seriously and the key to maintaining your health is an annual wellness physical.  Review of your medical records shows that it is time for your an

## (undated) NOTE — LETTER
[facility] [place]  [address] [address]  Dept: [phone]  Dept Fax: [phone]         October 31, 2018    Patient: Roney Nichole   YOB: 1970   Date of Visit: 10/31/2018       To Whom It May Concern:

## (undated) NOTE — LETTER
08/26/19    [address]          Dear Fernanda Hicks,    According to our records, you are due for your annual mammography screening. Please contact our office to obtain a mammogram order.  If this letter has been sent in er

## (undated) NOTE — LETTER
Date & Time: 11/20/2023, 10:31 AM  Patient: Zachery Kendrick  Encounter Provider(s):    MARITZA English       To Whom It May Concern:    Kody Gan was seen and treated in our department on 11/20/2023. She should not return to work until 11/25/23 .     If you have any questions or concerns, please do not hesitate to call.        _____________________________  Physician/APC Signature

## (undated) NOTE — LETTER
10/22/2021      [address] [facility]      Dear Patient,    IF YOU ARE 48YEARS OF AGE OR OLDER, COLORECTAL CANCER SCREENING IS RECOMMENDED AND COULD SAVE YOUR LIFE.      As your primary care doctor, I want to share with y

## (undated) NOTE — LETTER
09/26/19    [address]          Dear Kwadwo Miranda,    According to our records, you are due for your annual mammography screening. Please contact our office to obtain a mammogram order.  If this letter has been sent in er

## (undated) NOTE — MR AVS SNAPSHOT
[address] [place] [id number] [phone]               Thank you for choosing us for your health care visit with Cassandra Flores MD.  We are glad to serve you and happy to provide you with this summary of your taking this medication, and follow the directions you see here. Commonly known as:  ZOLOFT           * Sertraline HCl 100 MG Tabs   Take 1 tablet (100 mg total) by mouth daily. What changed:  Another medication with the same name was removed.  Continue Visits < Visit Summaries. MyChart questions? Call [phone] for help. [employer] is NOT to be used for urgent needs. For medical emergencies, dial 911.            Visit [email or web address] online at  [email or web address]

## (undated) NOTE — LETTER
Iona Cordero, :1970    CONSENT FOR PROCEDURE/SEDATION    1. I authorize the performance upon Iona Cordero  the following: Endometrial Biopsy    2.  I authorize Dr. Marylene Bidding, MD (and whomever is designated as the doctor’s assistant), to pe Witness: _________________________________________ Date:___________     Physician Signature: _______________________________ Date:___________

## (undated) NOTE — ED AVS SNAPSHOT
Rober Martinez   MRN: [id number]    Department:  THE [facility] in [place]   Date of Visit:  11/8/2018           Disclosure     Insurance plans vary and the physician(s) referred by the ER may not be covered by your plan.  Please cont tell this physician (or your personal doctor if your instructions are to return to your personal doctor) about any new or lasting problems. The primary care or specialist physician will see patients referred from the [facility] Emergency Department.  Ubaldo Patrick

## (undated) NOTE — LETTER
Date: 10/29/2018    Patient Name: Jourdan Hardin          To Whom it may concern: This letter has been written at the patient's request. The above patient was seen at the [facility] for treatment of a medical condition.     This patient

## (undated) NOTE — MR AVS SNAPSHOT
After Visit Summary   9/8/2020    Iona Cordero    MRN: [id number]           Visit Information     Date & Time  9/8/2020  3:30 PM Provider  Roma Lacey, [address], [place] Dept.  Phone  [phone]      Y You may have some light bleeding or blackish clumpy discharge for several days after your biopsy. Restrictions    You should avoid intercourse or tampon use for 1 day after your biopsy.     Pain    You may experience mild menstrual cramping after your bi [facility] now offers Video Visits through [address] for adult and pediatric patients. Video Visits are available Monday - Friday for many common conditions such as allergies, colds, cough, fever, rash, sore throat, headache and pink eye.   The cost for a Vid [address]   Monday – Friday  4:00 pm – 10:00 pm   Saturday – Sunday  10:00 am – 4:00 pm  [facility]  Emergency Medicine Providers  Conditions needing urgent attention, but are   non-life-threatening.     Also available by appointment Average cost  $120*

## (undated) NOTE — LETTER
IMMED[facility]ATE [facility]ARE [address] [place] [address],[address] [address]  [phone]     Patient: Mayuri Lopez   YOB: 1970   Date of Visit: 12/27/2021     Dear Employer,        December 27, 2021    At [facility], we are taking speci discontinue isolation and other precautions 10 days after the date of their first positive RT-PCR test for SARS-CoV-2 RNA.     Persons who are asymptomatic but have been exposed, CDC recommends 14 days of quarantine after exposure based on the time it takes